# Patient Record
Sex: FEMALE | Race: BLACK OR AFRICAN AMERICAN | Employment: FULL TIME | URBAN - METROPOLITAN AREA
[De-identification: names, ages, dates, MRNs, and addresses within clinical notes are randomized per-mention and may not be internally consistent; named-entity substitution may affect disease eponyms.]

---
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
## 2019-03-17 ENCOUNTER — HOSPITAL ENCOUNTER (EMERGENCY)
Age: 19
Discharge: HOME | End: 2019-03-17
Payer: COMMERCIAL

## 2019-03-17 DIAGNOSIS — J02.9 ACUTE PHARYNGITIS, UNSPECIFIED ETIOLOGY: Primary | ICD-10-CM

## 2019-03-17 DIAGNOSIS — Z71.1 CONCERN ABOUT STD IN MALE WITHOUT DIAGNOSIS: ICD-10-CM

## 2019-03-17 LAB
BACTERIA: ABNORMAL /HPF
BILIRUBIN URINE: NEGATIVE MG/DL
BLOOD, URINE: NEGATIVE
CLARITY: CLEAR
COLOR: YELLOW
GLUCOSE, URINE: NEGATIVE MG/DL
INTERPRETATION: NORMAL
KETONES, URINE: NEGATIVE MG/DL
LEUKOCYTE ESTERASE, URINE: NEGATIVE
MUCUS: ABNORMAL HPF
NITRITE URINE, QUANTITATIVE: NEGATIVE
PH, URINE: 6 (ref 5–8)
PREGNANCY, URINE: NEGATIVE
PROTEIN UA: NEGATIVE MG/DL
RBC URINE: 1 /HPF (ref 0–6)
SPECIFIC GRAVITY UA: 1.02 (ref 1–1.03)
SPECIFIC GRAVITY, URINE: 1.02 (ref 1–1.03)
SQUAMOUS EPITHELIAL: 4 /HPF
TRICHOMONAS: ABNORMAL /HPF
UROBILINOGEN, URINE: NORMAL MG/DL (ref 0.2–1)
WBC UA: 1 /HPF (ref 0–5)

## 2019-03-17 PROCEDURE — 6360000002 HC RX W HCPCS: Performed by: PHYSICIAN ASSISTANT

## 2019-03-17 PROCEDURE — 99283 EMERGENCY DEPT VISIT LOW MDM: CPT

## 2019-03-17 PROCEDURE — 96374 THER/PROPH/DIAG INJ IV PUSH: CPT

## 2019-03-17 PROCEDURE — 87491 CHLMYD TRACH DNA AMP PROBE: CPT

## 2019-03-17 PROCEDURE — 6370000000 HC RX 637 (ALT 250 FOR IP): Performed by: PHYSICIAN ASSISTANT

## 2019-03-17 PROCEDURE — 87081 CULTURE SCREEN ONLY: CPT

## 2019-03-17 PROCEDURE — 81001 URINALYSIS AUTO W/SCOPE: CPT

## 2019-03-17 PROCEDURE — 81025 URINE PREGNANCY TEST: CPT

## 2019-03-17 PROCEDURE — 87591 N.GONORRHOEAE DNA AMP PROB: CPT

## 2019-03-17 PROCEDURE — 87430 STREP A AG IA: CPT

## 2019-03-17 RX ORDER — FLUCONAZOLE 150 MG/1
150 TABLET ORAL ONCE
Qty: 1 TABLET | Refills: 0 | Status: SHIPPED | OUTPATIENT
Start: 2019-03-17 | End: 2019-03-17

## 2019-03-17 RX ORDER — FLUCONAZOLE 100 MG/1
200 TABLET ORAL ONCE
Status: COMPLETED | OUTPATIENT
Start: 2019-03-17 | End: 2019-03-17

## 2019-03-17 RX ORDER — DEXAMETHASONE SODIUM PHOSPHATE 10 MG/ML
10 INJECTION, SOLUTION INTRAMUSCULAR; INTRAVENOUS ONCE
Status: COMPLETED | OUTPATIENT
Start: 2019-03-17 | End: 2019-03-17

## 2019-03-17 RX ADMIN — PENICILLIN G BENZATHINE 1.2 MILLION UNITS: 1200000 INJECTION, SUSPENSION INTRAMUSCULAR at 05:55

## 2019-03-17 RX ADMIN — DEXAMETHASONE SODIUM PHOSPHATE 10 MG: 10 INJECTION, SOLUTION INTRAMUSCULAR; INTRAVENOUS at 05:21

## 2019-03-17 RX ADMIN — FLUCONAZOLE 200 MG: 100 TABLET ORAL at 06:42

## 2019-03-17 ASSESSMENT — PAIN DESCRIPTION - PAIN TYPE: TYPE: ACUTE PAIN

## 2019-03-17 ASSESSMENT — PAIN DESCRIPTION - LOCATION: LOCATION: THROAT

## 2019-03-19 LAB
CULTURE: ABNORMAL
Lab: ABNORMAL
SPECIMEN: ABNORMAL
STREP A DIRECT SCREEN: NEGATIVE

## 2019-03-20 LAB
CHLAMYDIA TRACHOMATIS AMPLIFIED DET: NEGATIVE
CHLAMYDIA TRACHOMATIS AMPLIFIED DET: NORMAL
N GONORRHOEAE AMPLIFIED DET: NEGATIVE
N GONORRHOEAE AMPLIFIED DET: NORMAL

## 2019-06-27 ENCOUNTER — HOSPITAL ENCOUNTER (EMERGENCY)
Age: 19
Discharge: OTHER INSTITUTION | End: 2019-06-29
Attending: EMERGENCY MEDICINE
Payer: COMMERCIAL

## 2019-06-27 DIAGNOSIS — R44.0 AUDITORY HALLUCINATIONS: Primary | ICD-10-CM

## 2019-06-27 DIAGNOSIS — F32.A DEPRESSION, UNSPECIFIED DEPRESSION TYPE: ICD-10-CM

## 2019-06-27 LAB
ACETAMINOPHEN LEVEL: <5 UG/ML (ref 15–30)
ALBUMIN SERPL-MCNC: 3.8 GM/DL (ref 3.4–5)
ALCOHOL SCREEN SERUM: NORMAL %WT/VOL
ALP BLD-CCNC: 93 IU/L (ref 40–129)
ALT SERPL-CCNC: 21 U/L (ref 10–40)
AMPHETAMINES: NEGATIVE
ANION GAP SERPL CALCULATED.3IONS-SCNC: 12 MMOL/L (ref 4–16)
AST SERPL-CCNC: 19 IU/L (ref 15–37)
BARBITURATE SCREEN URINE: NEGATIVE
BASOPHILS ABSOLUTE: 0 K/CU MM
BASOPHILS RELATIVE PERCENT: 0.5 % (ref 0–1)
BENZODIAZEPINE SCREEN, URINE: NEGATIVE
BILIRUB SERPL-MCNC: 0.2 MG/DL (ref 0–1)
BUN BLDV-MCNC: 10 MG/DL (ref 6–23)
CALCIUM SERPL-MCNC: 9.4 MG/DL (ref 8.3–10.6)
CANNABINOID SCREEN URINE: ABNORMAL
CHLORIDE BLD-SCNC: 102 MMOL/L (ref 99–110)
CO2: 23 MMOL/L (ref 21–32)
COCAINE METABOLITE: NEGATIVE
CREAT SERPL-MCNC: 0.7 MG/DL (ref 0.6–1.1)
DIFFERENTIAL TYPE: ABNORMAL
DOSE AMOUNT: ABNORMAL
DOSE AMOUNT: ABNORMAL
DOSE TIME: ABNORMAL
DOSE TIME: ABNORMAL
EOSINOPHILS ABSOLUTE: 0.4 K/CU MM
EOSINOPHILS RELATIVE PERCENT: 5.4 % (ref 0–3)
GFR AFRICAN AMERICAN: >60 ML/MIN/1.73M2
GFR NON-AFRICAN AMERICAN: >60 ML/MIN/1.73M2
GLUCOSE BLD-MCNC: 89 MG/DL (ref 70–99)
HCG QUALITATIVE: NEGATIVE
HCT VFR BLD CALC: 39.7 % (ref 37–47)
HEMOGLOBIN: 11.8 GM/DL (ref 12.5–16)
IMMATURE NEUTROPHIL %: 0.2 % (ref 0–0.43)
LYMPHOCYTES ABSOLUTE: 1.9 K/CU MM
LYMPHOCYTES RELATIVE PERCENT: 28.2 % (ref 24–44)
MCH RBC QN AUTO: 27.3 PG (ref 27–31)
MCHC RBC AUTO-ENTMCNC: 29.7 % (ref 32–36)
MCV RBC AUTO: 91.9 FL (ref 78–100)
MONOCYTES ABSOLUTE: 0.6 K/CU MM
MONOCYTES RELATIVE PERCENT: 9 % (ref 0–4)
NUCLEATED RBC %: 0 %
OPIATES, URINE: NEGATIVE
OXYCODONE: ABNORMAL
PDW BLD-RTO: 13.1 % (ref 11.7–14.9)
PHENCYCLIDINE, URINE: NEGATIVE
PLATELET # BLD: 210 K/CU MM (ref 140–440)
PMV BLD AUTO: 10.1 FL (ref 7.5–11.1)
POTASSIUM SERPL-SCNC: 3.8 MMOL/L (ref 3.5–5.1)
RBC # BLD: 4.32 M/CU MM (ref 4.2–5.4)
SALICYLATE LEVEL: <0.3 MG/DL (ref 15–30)
SEGMENTED NEUTROPHILS ABSOLUTE COUNT: 3.8 K/CU MM
SEGMENTED NEUTROPHILS RELATIVE PERCENT: 56.7 % (ref 36–66)
SODIUM BLD-SCNC: 137 MMOL/L (ref 135–145)
TOTAL IMMATURE NEUTOROPHIL: 0.01 K/CU MM
TOTAL NUCLEATED RBC: 0 K/CU MM
TOTAL PROTEIN: 7.2 GM/DL (ref 6.4–8.2)
WBC # BLD: 6.6 K/CU MM (ref 4–10.5)

## 2019-06-27 PROCEDURE — 85025 COMPLETE CBC W/AUTO DIFF WBC: CPT

## 2019-06-27 PROCEDURE — 80061 LIPID PANEL: CPT

## 2019-06-27 PROCEDURE — 84443 ASSAY THYROID STIM HORMONE: CPT

## 2019-06-27 PROCEDURE — 83721 ASSAY OF BLOOD LIPOPROTEIN: CPT

## 2019-06-27 PROCEDURE — 99285 EMERGENCY DEPT VISIT HI MDM: CPT

## 2019-06-27 PROCEDURE — 80053 COMPREHEN METABOLIC PANEL: CPT

## 2019-06-27 PROCEDURE — G0480 DRUG TEST DEF 1-7 CLASSES: HCPCS

## 2019-06-27 PROCEDURE — 84703 CHORIONIC GONADOTROPIN ASSAY: CPT

## 2019-06-27 PROCEDURE — 80307 DRUG TEST PRSMV CHEM ANLYZR: CPT

## 2019-06-27 PROCEDURE — 36415 COLL VENOUS BLD VENIPUNCTURE: CPT

## 2019-06-28 VITALS
HEART RATE: 55 BPM | RESPIRATION RATE: 16 BRPM | OXYGEN SATURATION: 97 % | TEMPERATURE: 98 F | DIASTOLIC BLOOD PRESSURE: 80 MMHG | WEIGHT: 280 LBS | HEIGHT: 69 IN | SYSTOLIC BLOOD PRESSURE: 140 MMHG | BODY MASS INDEX: 41.47 KG/M2

## 2019-06-28 NOTE — ED NOTES
Resting quietly in exam room with eyes closed.   remains at bedside     Isma Landeros RN  06/28/19 [id number]

## 2019-06-28 NOTE — ED NOTES
Spoke with Magui Curiel at [facility], request patient to take morning medication of Abilify before being transported by [facility] at 8:30 am. Nurse to Nurse to be called in AM to [phone]. Updated Rebeca Gaytan RN on plan of care.       Sue Kenney, EZEQUIEL  06/28/19 2724

## 2019-06-28 NOTE — ED NOTES
Pt reports needing 72 hour stay for what she is calling menstrual depression that has been going on for the last 3 months.       Domenica Royal RN  06/27/19 0234

## 2019-06-28 NOTE — ED NOTES
Report given to E[facility]QUIEL Girard at [facility]   Pt transported via [facility]   No complications   Pt belongings sent with med trans  EMS took emtala form with copy of chart - will call in 1 hour to have copy faxed from mental health       Ene Morataya RN  06/28/19 [address] [facility] [address], RN  06/28/19 8221

## 2019-06-28 NOTE — ED PROVIDER NOTES
eMERGENCY dEPARTMENT eNCOUnter        CHIEF COMPLAINT    Chief Complaint   Patient presents with    Depression     reports having menstural depression       [facility]    Eliza Villalba is a 20 y.o. female who presents with depression. Patient reports a history of depression and follows with Dr. Ventura Winter. She is currently on Abilify. She states her depression has been worse for the last 3 months, particularly around the time of her menstrual period. She states when she is on her menstrual period she starts having suicidal ideation. She denies any plan or attempts. She is not currently on her menstrual period but is due to start next week. She reports trying to get an appointment with Dr. Ventura Winter to discuss possibly adjusting her medications, but she was told she cannot be seen until July. She states she smokes marijuana but denies any other drug or ETOH use. Denies any HI.      REVIEW OF SYSTEMS    See HPI for further details. Review of systems otherwise negative. Constitutional:  Denies fever, chills. HENT:  Denies headache, dizziness, lightheadedness. Respiratory:  Denies any shortness of breath. Cardiovascular:  Denies chest pain, palpitations. GI:  Denies abdominal pain, nausea, vomiting, diarrhea. :  Denies dysuria. Musculoskeletal:  Denies edema, tenderness. Integument:  Denies rashes, lesions. Neurologic:  Denies altered mental status. Denies any numbness or tingling. Psychiatric:  + depression, + suicidal ideation    PAST MEDICAL HISTORY    Past Medical History:   Diagnosis Date    Asthma     Depression     Seasonal allergies        SURGICAL HISTORY    History reviewed. No pertinent surgical history.     CURRENT MEDICATIONS    Current Outpatient Rx   Medication Sig Dispense Refill    Benzocaine-Menthol (CHLORASEPTIC MAX SORE THROAT) 15-10 MG LOZG Take 1 lozenge by mouth every 2 hours as needed (sore throat) 15 lozenge 0       ALLERGIES    No Known Allergies    FAMILY HISTORY    History

## 2019-06-29 LAB
CHOLESTEROL: 124 MG/DL
HDLC SERPL-MCNC: 43 MG/DL
LDL CHOLESTEROL DIRECT: 75 MG/DL
TRIGL SERPL-MCNC: 62 MG/DL
TSH HIGH SENSITIVITY: 1.08 UIU/ML (ref 0.27–4.2)

## 2019-09-22 ASSESSMENT — PAIN DESCRIPTION - PAIN TYPE: TYPE: ACUTE PAIN

## 2019-09-22 ASSESSMENT — PAIN DESCRIPTION - LOCATION: LOCATION: CHEST

## 2019-09-22 ASSESSMENT — PAIN SCALES - GENERAL: PAINLEVEL_OUTOF10: 5

## 2019-09-23 ENCOUNTER — HOSPITAL ENCOUNTER (EMERGENCY)
Age: 19
Discharge: HOME | End: 2019-09-23
Attending: EMERGENCY MEDICINE
Payer: COMMERCIAL

## 2019-09-23 ENCOUNTER — APPOINTMENT (OUTPATIENT)
Dept: GENERAL RADIOLOGY | Age: 19
End: 2019-09-23
Payer: COMMERCIAL

## 2019-09-23 VITALS
HEIGHT: 70 IN | BODY MASS INDEX: 38.65 KG/M2 | HEART RATE: 71 BPM | OXYGEN SATURATION: 96 % | TEMPERATURE: 98.1 F | SYSTOLIC BLOOD PRESSURE: 131 MMHG | RESPIRATION RATE: 15 BRPM | DIASTOLIC BLOOD PRESSURE: 81 MMHG | WEIGHT: 270 LBS

## 2019-09-23 DIAGNOSIS — R05.9 COUGH: Primary | ICD-10-CM

## 2019-09-23 PROCEDURE — 6370000000 HC RX 637 (ALT 250 FOR IP): Performed by: EMERGENCY MEDICINE

## 2019-09-23 PROCEDURE — 99283 EMERGENCY DEPT VISIT LOW MDM: CPT

## 2019-09-23 PROCEDURE — 71045 X-RAY EXAM CHEST 1 VIEW: CPT

## 2019-09-23 RX ORDER — BENZONATATE 100 MG/1
100 CAPSULE ORAL ONCE
Status: COMPLETED | OUTPATIENT
Start: 2019-09-23 | End: 2019-09-23

## 2019-09-23 RX ORDER — NAPROXEN 250 MG/1
500 TABLET ORAL ONCE
Status: COMPLETED | OUTPATIENT
Start: 2019-09-23 | End: 2019-09-23

## 2019-09-23 RX ADMIN — BENZONATATE 100 MG: 100 CAPSULE ORAL at 01:13

## 2019-09-23 RX ADMIN — NAPROXEN 500 MG: 250 TABLET ORAL at 01:13

## 2019-09-23 ASSESSMENT — PAIN SCALES - GENERAL: PAINLEVEL_OUTOF10: 5

## 2019-09-23 NOTE — ED PROVIDER NOTES
thoroughly with the patient and family if present. If performed, all imaging and lab work also discussed with patient. All relevant prior results and chart reviewed if available. Patient presents with signs and symptoms most consistent with viral URI. She is well-appearing with normal vital signs and in no respiratory distress. She has no signs or symptoms consistent with deep neck tissue infection, PTA, meningitis. Chest x-ray shows no evidence of pneumonia. She is given naproxen and Tessalon Perles for symptoms. She agrees to drink plenty of fluids at home, follow-up with PCP as needed. Clinical Impression:  1.  Cough      (Please note that portions of this note may have been completed with a voice recognition program. Efforts were made to edit the dictations but occasionally words are mis-transcribed.)    MD Talon Corral MD  09/23/19 9237

## 2020-01-06 ENCOUNTER — HOSPITAL ENCOUNTER (EMERGENCY)
Age: 20
Discharge: HOME | End: 2020-01-06
Attending: EMERGENCY MEDICINE
Payer: COMMERCIAL

## 2020-01-06 ENCOUNTER — APPOINTMENT (OUTPATIENT)
Dept: CT IMAGING | Age: 20
End: 2020-01-06

## 2020-01-06 VITALS
TEMPERATURE: 98.5 F | HEIGHT: 69 IN | OXYGEN SATURATION: 100 % | SYSTOLIC BLOOD PRESSURE: 135 MMHG | WEIGHT: 280 LBS | BODY MASS INDEX: 41.47 KG/M2 | HEART RATE: 108 BPM | DIASTOLIC BLOOD PRESSURE: 76 MMHG | RESPIRATION RATE: 29 BRPM

## 2020-01-06 LAB
ALBUMIN SERPL-MCNC: 3.7 GM/DL (ref 3.4–5)
ALP BLD-CCNC: 91 IU/L (ref 40–129)
ALT SERPL-CCNC: 20 U/L (ref 10–40)
ANION GAP SERPL CALCULATED.3IONS-SCNC: 10 MMOL/L (ref 4–16)
AST SERPL-CCNC: 18 IU/L (ref 15–37)
BASOPHILS ABSOLUTE: 0 K/CU MM
BASOPHILS RELATIVE PERCENT: 0.2 % (ref 0–1)
BILIRUB SERPL-MCNC: 0.2 MG/DL (ref 0–1)
BUN BLDV-MCNC: 9 MG/DL (ref 6–23)
CALCIUM SERPL-MCNC: 9.5 MG/DL (ref 8.3–10.6)
CHLORIDE BLD-SCNC: 100 MMOL/L (ref 99–110)
CHP ED QC CHECK: NORMAL
CO2: 22 MMOL/L (ref 21–32)
CREAT SERPL-MCNC: 0.6 MG/DL (ref 0.6–1.1)
DIFFERENTIAL TYPE: ABNORMAL
EOSINOPHILS ABSOLUTE: 0.2 K/CU MM
EOSINOPHILS RELATIVE PERCENT: 2.1 % (ref 0–3)
GFR AFRICAN AMERICAN: >60 ML/MIN/1.73M2
GFR NON-AFRICAN AMERICAN: >60 ML/MIN/1.73M2
GLUCOSE BLD-MCNC: 112 MG/DL (ref 70–99)
GLUCOSE BLD-MCNC: 96 MG/DL
GLUCOSE BLD-MCNC: 96 MG/DL (ref 70–99)
HCG QUALITATIVE: NEGATIVE
HCT VFR BLD CALC: 41 % (ref 37–47)
HEMOGLOBIN: 12.5 GM/DL (ref 12.5–16)
IMMATURE NEUTROPHIL %: 0.4 % (ref 0–0.43)
LYMPHOCYTES ABSOLUTE: 1.4 K/CU MM
LYMPHOCYTES RELATIVE PERCENT: 16.8 % (ref 24–44)
MCH RBC QN AUTO: 27.5 PG (ref 27–31)
MCHC RBC AUTO-ENTMCNC: 30.5 % (ref 32–36)
MCV RBC AUTO: 90.3 FL (ref 78–100)
MONOCYTES ABSOLUTE: 0.6 K/CU MM
MONOCYTES RELATIVE PERCENT: 7 % (ref 0–4)
NUCLEATED RBC %: 0 %
PDW BLD-RTO: 13 % (ref 11.7–14.9)
PLATELET # BLD: 196 K/CU MM (ref 140–440)
PMV BLD AUTO: 10.1 FL (ref 7.5–11.1)
POTASSIUM SERPL-SCNC: 3.9 MMOL/L (ref 3.5–5.1)
RBC # BLD: 4.54 M/CU MM (ref 4.2–5.4)
SEGMENTED NEUTROPHILS ABSOLUTE COUNT: 6.1 K/CU MM
SEGMENTED NEUTROPHILS RELATIVE PERCENT: 73.5 % (ref 36–66)
SODIUM BLD-SCNC: 132 MMOL/L (ref 135–145)
TOTAL IMMATURE NEUTOROPHIL: 0.03 K/CU MM
TOTAL NUCLEATED RBC: 0 K/CU MM
TOTAL PROTEIN: 7.4 GM/DL (ref 6.4–8.2)
WBC # BLD: 8.2 K/CU MM (ref 4–10.5)

## 2020-01-06 PROCEDURE — 84703 CHORIONIC GONADOTROPIN ASSAY: CPT

## 2020-01-06 PROCEDURE — 93010 ELECTROCARDIOGRAM REPORT: CPT | Performed by: INTERNAL MEDICINE

## 2020-01-06 PROCEDURE — 80053 COMPREHEN METABOLIC PANEL: CPT

## 2020-01-06 PROCEDURE — 93005 ELECTROCARDIOGRAM TRACING: CPT | Performed by: EMERGENCY MEDICINE

## 2020-01-06 PROCEDURE — 82962 GLUCOSE BLOOD TEST: CPT

## 2020-01-06 PROCEDURE — 70450 CT HEAD/BRAIN W/O DYE: CPT

## 2020-01-06 PROCEDURE — 99284 EMERGENCY DEPT VISIT MOD MDM: CPT

## 2020-01-06 PROCEDURE — 85025 COMPLETE CBC W/AUTO DIFF WBC: CPT

## 2020-01-06 ASSESSMENT — ENCOUNTER SYMPTOMS
VOMITING: 0
ABDOMINAL PAIN: 0
NAUSEA: 1
SORE THROAT: 0
EYE REDNESS: 0
SHORTNESS OF BREATH: 0
DIARRHEA: 0
RHINORRHEA: 0
COUGH: 0
CONSTIPATION: 0
BACK PAIN: 0

## 2020-01-06 ASSESSMENT — PAIN DESCRIPTION - PAIN TYPE: TYPE: ACUTE PAIN

## 2020-01-06 ASSESSMENT — PAIN DESCRIPTION - LOCATION: LOCATION: HEAD

## 2020-01-06 ASSESSMENT — PAIN SCALES - GENERAL: PAINLEVEL_OUTOF10: 5

## 2020-01-06 NOTE — ED PROVIDER NOTES
Highest education level: Not on file   Occupational History    Not on file   Social Needs    Financial resource strain: Not on file    Food insecurity:     Worry: Not on file     Inability: Not on file    Transportation needs:     Medical: Not on file     Non-medical: Not on file   Tobacco Use    Smoking status: Never Smoker    Smokeless tobacco: Never Used   Substance and Sexual Activity    Alcohol use: Yes     Comment: rare    Drug use: Yes     Types: Marijuana    Sexual activity: Never   Lifestyle    Physical activity:     Days per week: Not on file     Minutes per session: Not on file    Stress: Not on file   Relationships    Social connections:     Talks on phone: Not on file     Gets together: Not on file     Attends Confucianism service: Not on file     Active member of club or organization: Not on file     Attends meetings of clubs or organizations: Not on file     Relationship status: Not on file    Intimate partner violence:     Fear of current or ex partner: Not on file     Emotionally abused: Not on file     Physically abused: Not on file     Forced sexual activity: Not on file   Other Topics Concern    Not on file   Social History Narrative    Not on file     No current facility-administered medications for this encounter. Current Outpatient Medications   Medication Sig Dispense Refill    Benzocaine-Menthol (CHLORASEPTIC MAX SORE THROAT) 15-10 MG LOZG Take 1 lozenge by mouth every 2 hours as needed (sore throat) 15 lozenge 0     No Known Allergies    Nursing Notes Reviewed     Physical Exam:   ED Triage Vitals [01/06/20 1233]   Enc Vitals Group      /76      Pulse 98      Resp 24      Temp 98.5 °F (36.9 °C)      Temp Source Oral      SpO2 100 %      Weight 280 lb (127 kg)      Height 5' 9\" (1.753 m)      Head Circumference       Peak Flow       Pain Score       Pain Loc       Pain Edu? Excl. in [address]?       /76   Pulse 108   Temp 98.5 °F (36.9 °C) (Oral)   Resp 29 % 16.8 (L) 24 - 44 %    Monocytes % 7.0 (H) 0 - 4 %    Eosinophils % 2.1 0 - 3 %    Basophils % 0.2 0 - 1 %    Segs Absolute 6.1 K/CU MM    Lymphocytes Absolute 1.4 K/CU MM    Monocytes Absolute 0.6 K/CU MM    Eosinophils Absolute 0.2 K/CU MM    Basophils Absolute 0.0 K/CU MM    Nucleated RBC % 0.0 %    Total Nucleated RBC 0.0 K/CU MM    Total Immature Neutrophil 0.03 K/CU MM    Immature Neutrophil % 0.4 0 - 0.43 %   Comprehensive Metabolic Panel w/ Reflex to MG   Result Value Ref Range    Sodium 132 (L) 135 - 145 MMOL/L    Potassium 3.9 3.5 - 5.1 MMOL/L    Chloride 100 99 - 110 mMol/L    CO2 22 21 - 32 MMOL/L    BUN 9 6 - 23 MG/DL    CREATININE 0.6 0.6 - 1.1 MG/DL    Glucose 112 (H) 70 - 99 MG/DL    Calcium 9.5 8.3 - 10.6 MG/DL    Alb 3.7 3.4 - 5.0 GM/DL    Total Protein 7.4 6.4 - 8.2 GM/DL    Total Bilirubin 0.2 0.0 - 1.0 MG/DL    ALT 20 10 - 40 U/L    AST 18 15 - 37 IU/L    Alkaline Phosphatase 91 40 - 129 IU/L    GFR Non-African American >60 >60 mL/min/1.73m2    GFR African American >60 >60 mL/min/1.73m2    Anion Gap 10 4 - 16   HCG Serum, Qualitative   Result Value Ref Range    hCG Qual NEGATIVE    POC Blood Glucose   Result Value Ref Range    Glucose 96 mg/dL    QC OK? y    POCT Glucose   Result Value Ref Range    POC Glucose 96 70 - 99 MG/DL   EKG 12 Lead   Result Value Ref Range    Ventricular Rate 98 BPM    Atrial Rate 98 BPM    P-R Interval 204 ms    QRS Duration 82 ms    Q-T Interval 326 ms    QTc Calculation (Bazett) 416 ms    P Axis 51 degrees    R Axis 48 degrees    T Axis 20 degrees    Diagnosis       Normal sinus rhythm with sinus arrhythmia  Normal ECG  No previous ECGs available        Radiographs (if obtained):  [] The following radiograph was interpreted by myself in the absence of a radiologist:  [x]Radiologist's Report Reviewed:  CT Head WO Contrast   Final Result   No acute intracranial abnormality.                EKG (if obtained): (All EKG's are interpreted by myself in the absence of a cardiologist)  Normal sinus rhythm with sinus arrhythmia. Rate of 98. NH interval 204, QRS 82, QTc 416. No ST elevations or depressions. Normal T waves. Impression: Normal EKG. No previous EKGs for comparison. MDM:  Differential diagnoses considered include orthostatic syncope, hypoglycemia, electrolyte abnormality, cardiac arrhythmia, ectopic pregnancy, vasovagal syncope. EKG is not concerning for acute ischemia or arrhythmia. Upon arrival patient is not tachycardic and has normal vital signs. Basic labs are obtained and are unremarkable. Pregnancy test is negative. I do not suspect ectopic pregnancy. Glucose levels are normal.  CT scan of patient's head shows no acute intracranial abnormalities. Patient's orthostatic vital signs are negative. Patient states she has not been eating or drinking much lately and I suspect some of her symptoms are due to mild dehydration. She did have a doughnut and a Frappuccino for breakfast this morning I suspect that also contributed to her symptoms. I encouraged her to eat more protein filled breakfast.  I do not suspect an emergent cause of her symptoms or any emergent injuries from her fall. We will discharge her home in stable condition. Recommended close follow-up with her primary care physician. Cardiac monitor revealed sinus rhythm with a heart rate of 90 as interpreted by me. Patient presented with syncope and cardiac monitor was ordered to monitor for dysrhythmia. Plan of care explained to patient. Concerning signs and symptoms warranting a return visit to the Emergency Department were explained in detail. All questions and concerns were addressed to the patient's satisfaction. Patient understood and agreed with plan. The likelihood of other entities in the differential is insufficient to justify any further testing for them. This was explained to the patient.  The patient was advised that persistent or worsening symptoms would

## 2020-01-14 LAB
EKG ATRIAL RATE: 98 BPM
EKG DIAGNOSIS: NORMAL
EKG P AXIS: 51 DEGREES
EKG P-R INTERVAL: 204 MS
EKG Q-T INTERVAL: 326 MS
EKG QRS DURATION: 82 MS
EKG QTC CALCULATION (BAZETT): 416 MS
EKG R AXIS: 48 DEGREES
EKG T AXIS: 20 DEGREES
EKG VENTRICULAR RATE: 98 BPM

## 2020-03-12 ENCOUNTER — HOSPITAL ENCOUNTER (EMERGENCY)
Age: 20
Discharge: HOME HEALTH | End: 2020-03-12
Payer: COMMERCIAL

## 2020-03-19 ENCOUNTER — HOSPITAL ENCOUNTER (EMERGENCY)
Age: 20
Discharge: HOME | End: 2020-03-20
Attending: EMERGENCY MEDICINE
Payer: MEDICAID

## 2020-03-19 PROCEDURE — 99284 EMERGENCY DEPT VISIT MOD MDM: CPT

## 2020-03-20 ENCOUNTER — APPOINTMENT (OUTPATIENT)
Dept: GENERAL RADIOLOGY | Age: 20
End: 2020-03-20
Payer: MEDICAID

## 2020-03-20 VITALS
BODY MASS INDEX: 43.95 KG/M2 | RESPIRATION RATE: 18 BRPM | SYSTOLIC BLOOD PRESSURE: 115 MMHG | HEART RATE: 95 BPM | WEIGHT: 280 LBS | TEMPERATURE: 98.1 F | OXYGEN SATURATION: 98 % | HEIGHT: 67 IN | DIASTOLIC BLOOD PRESSURE: 85 MMHG

## 2020-03-20 PROCEDURE — 93010 ELECTROCARDIOGRAM REPORT: CPT | Performed by: INTERNAL MEDICINE

## 2020-03-20 PROCEDURE — 71046 X-RAY EXAM CHEST 2 VIEWS: CPT

## 2020-03-20 PROCEDURE — 93005 ELECTROCARDIOGRAM TRACING: CPT | Performed by: EMERGENCY MEDICINE

## 2020-03-20 RX ORDER — GUAIFENESIN 600 MG/1
600 TABLET, EXTENDED RELEASE ORAL 2 TIMES DAILY
Qty: 20 TABLET | Refills: 0 | Status: SHIPPED | OUTPATIENT
Start: 2020-03-20 | End: 2020-03-30

## 2020-03-20 RX ORDER — BENZONATATE 100 MG/1
100 CAPSULE ORAL 3 TIMES DAILY PRN
Qty: 20 CAPSULE | Refills: 0 | Status: SHIPPED | OUTPATIENT
Start: 2020-03-20 | End: 2020-03-27

## 2020-03-20 RX ORDER — CIPROFLOXACIN AND DEXAMETHASONE 3; 1 MG/ML; MG/ML
4 SUSPENSION/ DROPS AURICULAR (OTIC) 2 TIMES DAILY
Qty: 1 BOTTLE | Refills: 0 | Status: SHIPPED | OUTPATIENT
Start: 2020-03-20 | End: 2020-03-27

## 2020-03-20 ASSESSMENT — ENCOUNTER SYMPTOMS
SHORTNESS OF BREATH: 0
TROUBLE SWALLOWING: 0
EYE ITCHING: 0
EYES NEGATIVE: 1
EYE REDNESS: 0
VOMITING: 0
RHINORRHEA: 0
GASTROINTESTINAL NEGATIVE: 1
CHOKING: 0
CHEST TIGHTNESS: 0
CONSTIPATION: 0
SINUS PRESSURE: 0
SORE THROAT: 0
VOICE CHANGE: 0
BACK PAIN: 0
NAUSEA: 0
BLOOD IN STOOL: 0
WHEEZING: 0
RECTAL PAIN: 0
DIARRHEA: 0
APNEA: 0
COUGH: 1
ABDOMINAL PAIN: 0
EYE DISCHARGE: 0
FACIAL SWELLING: 0
SINUS PAIN: 0
EYE PAIN: 0
STRIDOR: 0
PHOTOPHOBIA: 0

## 2020-03-20 NOTE — ED PROVIDER NOTES
[facility] [place]      [place] Name: Chin Stewart  MRN: [id number]  [place] 2000  Date of evaluation: 3/19/2020  Provider: Arnav Mei DO    CHIEF COMPLAINT       Chief Complaint   Patient presents with    Cough     started today     Rectal Bleeding     states that she is lactose intolerant and has been \"eating a lot of cereal with milk\"          HISTORY OF PRESENT ILLNESS      Chin Stewart is a 23 y.o. female who presents to the emergency department  for   Chief Complaint   Patient presents with    Cough     started today     Rectal Bleeding     states that she is lactose intolerant and has been \"eating a lot of cereal with milk\"        The history is provided by the patient. No  was used. Ear Problem   Location:  Right  Behind ear:  No abnormality  Quality:  Aching  Severity:  Mild  Onset quality:  Gradual  Duration:  1 day  Timing:  Constant  Progression:  Worsening  Chronicity:  New  Context: not direct blow, not elevation change, not foreign body in ear, not loud noise, not recent URI and not water in ear    Relieved by:  Nothing  Worsened by:  Nothing  Ineffective treatments:  None tried  Associated symptoms: cough and ear discharge    Associated symptoms: no abdominal pain, no congestion, no diarrhea, no fever, no headaches, no hearing loss, no neck pain, no rash, no rhinorrhea, no sore throat, no tinnitus and no vomiting    Risk factors: no recent travel and no prior ear surgery          Nursing Notes, Triage Notes & Vital Signs were reviewed. REVIEW OF SYSTEMS    (2-9 systems for level 4, 10 or more for level 5)     Review of Systems   Constitutional: Negative. Negative for activity change, appetite change, chills, fatigue and fever. HENT: Positive for ear discharge.  Negative for congestion, dental problem, drooling, facial swelling, hearing loss, nosebleeds, postnasal drip, rhinorrhea, sinus pressure, sinus pain, sore throat, tinnitus, trouble swallowing and voice change. Eyes: Negative. Negative for photophobia, pain, discharge, redness, itching and visual disturbance. Respiratory: Positive for cough. Negative for apnea, choking, chest tightness, shortness of breath, wheezing and stridor. Cardiovascular: Negative. Negative for chest pain, palpitations and leg swelling. Gastrointestinal: Negative. Negative for abdominal pain, blood in stool, constipation, diarrhea, nausea, rectal pain and vomiting. Endocrine: Negative for polyuria. Genitourinary: Negative. Negative for dysuria, flank pain, frequency, hematuria and urgency. Musculoskeletal: Negative. Negative for arthralgias, back pain, gait problem, myalgias, neck pain and neck stiffness. Skin: Negative. Negative for rash. Neurological: Negative. Negative for dizziness, speech difficulty, light-headedness, numbness and headaches. Psychiatric/Behavioral: Negative. Negative for agitation, confusion, self-injury, sleep disturbance and suicidal ideas. The patient is not nervous/anxious. All other systems reviewed and are negative. Except as noted above the remainder of the review of systems was reviewed and negative. PAST MEDICAL HISTORY     Past Medical History:   Diagnosis Date    Asthma     Depression     Seasonal allergies        Prior to Admission medications    Medication Sig Start Date End Date Taking? Authorizing Provider   benzonatate (TESSALON) 100 MG capsule Take 1 capsule by mouth 3 times daily as needed for Cough 3/20/20 3/27/20 Yes Tracey Ricketts DO   guaiFENesin [facility]) 600 MG extended release tablet Take 1 tablet by mouth 2 times daily for 10 days 3/20/20 3/30/20 Yes Tracey Ricketts DO   ciprofloxacin-dexamethasone (CIPRODEX) 0.3-0.1 % otic suspension Place 4 drops in ear(s) 2 times daily for 7 days Apply in affected ear(s).  Dispense QS 7 day course. 3/20/20 3/27/20 Yes Estefany Ambrosio,    Benzocaine-Menthol (CHLORASEPTIC MAX SORE THROAT) 15-10 MG LOZG Take 1 lozenge by mouth every 2 hours as needed (sore throat) 3/17/19   Yolanda Gabriel PA-C        There is no problem list on file for this patient. SURGICAL HISTORY     History reviewed. No pertinent surgical history. CURRENT MEDICATIONS       Previous Medications    BENZOCAINE-MENTHOL (CHLORASEPTIC MAX SORE THROAT) 15-10 MG LOZG    Take 1 lozenge by mouth every 2 hours as needed (sore throat)       ALLERGIES     Patient has no known allergies. FAMILY HISTORY     History reviewed. No pertinent family history.        SOCIAL HISTORY       Social History     Socioeconomic History    Marital status: Single     Spouse name: None    Number of children: None    Years of education: None    Highest education level: None   Occupational History    None   Social Needs    Financial resource strain: None    Food insecurity     Worry: None     Inability: None    Transportation needs     Medical: None     Non-medical: None   Tobacco Use    Smoking status: Never Smoker    Smokeless tobacco: Never Used   Substance and Sexual Activity    Alcohol use: Yes     Comment: rare    Drug use: Yes     Types: Marijuana    Sexual activity: Never   Lifestyle    Physical activity     Days per week: None     Minutes per session: None    Stress: None   Relationships    Social connections     Talks on phone: None     Gets together: None     Attends Mandaen service: None     Active member of club or organization: None     Attends meetings of clubs or organizations: None     Relationship status: None    Intimate partner violence     Fear of current or ex partner: None     Emotionally abused: None     Physically abused: None     Forced sexual activity: None   Other Topics Concern    None   Social History Narrative    None       SCREENINGS    [place] Coma Scale  Eye Opening: Spontaneous  Best Verbal Response: Oriented  Best Motor Response: Obeys commands  Vlad Coma Scale Score: 15          PHYSICAL EXAM    (up to 7 for level 4, 8 or more for level 5)     ED Triage Vitals   BP Temp Temp Source Pulse Resp SpO2 Height Weight   03/19/20 2354 03/19/20 2336 03/19/20 2336 03/19/20 2336 03/19/20 2336 03/19/20 2336 03/19/20 2332 03/19/20 2332   (!) 110/90 98.1 °F (36.7 °C) Oral 115 16 98 % 5' 7\" (1.702 m) 280 lb (127 kg)       Physical Exam  Vitals signs and nursing note reviewed. Constitutional:       General: She is not in acute distress. Appearance: She is well-developed. She is not diaphoretic. HENT:      Head: Normocephalic and atraumatic. Right Ear: Tympanic membrane and external ear normal. Drainage and swelling present. No laceration. Left Ear: Hearing, tympanic membrane, ear canal and external ear normal.      Nose: Nose normal.      Mouth/Throat:      Pharynx: No oropharyngeal exudate. Eyes:      General: No scleral icterus. Right eye: No discharge. Left eye: No discharge. Pupils: Pupils are equal, round, and reactive to light. Neck:      Musculoskeletal: Normal range of motion. Thyroid: No thyromegaly. Vascular: No JVD. Trachea: No tracheal deviation. Cardiovascular:      Rate and Rhythm: Normal rate and regular rhythm. Heart sounds: Normal heart sounds. No murmur. No friction rub. No gallop. Pulmonary:      Effort: Pulmonary effort is normal. No respiratory distress. Breath sounds: Normal breath sounds. No stridor. No wheezing or rales. Chest:      Chest wall: No tenderness. Abdominal:      General: Bowel sounds are normal. There is no distension. Palpations: Abdomen is soft. There is no mass. Tenderness: There is no abdominal tenderness. There is no guarding or rebound. Musculoskeletal: Normal range of motion. General: No tenderness or deformity. Lymphadenopathy:      Cervical: No cervical adenopathy.    Skin:     General: Skin is warm.      Capillary Refill: Capillary refill takes less than 2 seconds. Coloration: Skin is not pale. Findings: No erythema or rash. Neurological:      Mental Status: She is alert and oriented to person, place, and time. Cranial Nerves: No cranial nerve deficit. Sensory: No sensory deficit. Motor: No abnormal muscle tone. Coordination: Coordination normal.      Deep Tendon Reflexes: Reflexes normal.   Psychiatric:         Behavior: Behavior normal.         Thought Content: Thought content normal.         Judgment: Judgment normal.         DIAGNOSTIC RESULTS     Labs Reviewed   URINALYSIS   PREGNANCY, URINE          EKG: All EKG's are interpreted by the Emergency Department Physician who either signs or Co-signs this chart in the absence of a cardiologist.     EKG Interpretation    Interpreted by emergency department physician    Rhythm: normal sinus   Rate: normal  Axis: normal  Ectopy: none  Conduction: 1st degree AV block  ST Segments: no acute change  T Waves: no acute change  Q Waves: none    Clinical Impression: no acute changes    Surendra Ordoñez    EKG Interpretation    Interpreted by emergency department physician    [address]:     Non-plain film images such as CT, Ultrasound and MRI are read by the radiologist. Plain radiographic images are visualized and preliminarily interpreted by the emergency physician. Interpretation per the Radiologist below, if available at the time of this note:    XR CHEST STANDARD (2 VW)    (Results Pending)         ED BEDSIDE ULTRASOUND:   Performed by ED Physician Surendra Ordoñez DO       LABS:  Labs Reviewed   URINALYSIS   PREGNANCY, URINE       All other labs were within normal range or not returned as of this dictation.     EMERGENCY DEPARTMENT COURSE and DIFFERENTIAL DIAGNOSIS/MDM:   Vitals:    Vitals:    03/19/20 2332 03/19/20 2336 03/19/20 2354   BP:   (!) 110/90   Pulse:  115    Resp:  16    Temp:  98.1 °F (36.7 °C)    TempSrc:  Oral    SpO2:  98%    Weight: 280 lb (127 kg)     Height: 5' 7\" (1.702 m)             MDM  Number of Diagnoses or Management Options  Diagnosis management comments: 54-year-old female presents emergency department with chief complaint of cough and reported discharge and minimal blood from the right ear. On examination, patient has some erythema to the external auditory canal.  Patient has minimal discharge. Patient denies rectal bleeding though that was mentioned from triage. Chest x-ray was negative. Patient is afebrile. Patient does not have chest pain or shortness of breath. Patient has no other risk factors for pulmonary embolism. Patient is afebrile and has no contacts with anyone under investigation for the novel coronavirus. Will discharge patient to home with Ciprodex, Tessalon, return precautions and primary care follow-up. Amount and/or Complexity of Data Reviewed  Clinical lab tests: ordered and reviewed  Tests in the radiology section of CPT®: ordered and reviewed  Tests in the medicine section of CPT®: ordered and reviewed    Risk of Complications, Morbidity, and/or Mortality  Presenting problems: moderate  Diagnostic procedures: moderate  Management options: moderate    Critical Care  Total time providing critical care: < 30 minutes    Patient Progress  Patient progress: improved        REASSESSMENT          CRITICAL CARE TIME     Total critical care time provided today was 0 minutes. This excludes seperately billable procedures and family discussion time. Critical care time provided for obtaining history, conducting a physical exam, performing and monitoring interventions, ordering, collecting and interpreting tests, and establishing medical decision-making. There was a potential for life/limb threatening pathology requiring close evaluation and intervention with concern for patient decompensation.     CONSULTS:  None    PROCEDURES:  None performed unless otherwise noted

## 2020-03-27 ENCOUNTER — HOSPITAL ENCOUNTER (EMERGENCY)
Age: 20
Discharge: HOME | End: 2020-03-28
Attending: EMERGENCY MEDICINE
Payer: MEDICAID

## 2020-03-27 ENCOUNTER — APPOINTMENT (OUTPATIENT)
Dept: GENERAL RADIOLOGY | Age: 20
End: 2020-03-27
Payer: MEDICAID

## 2020-03-27 LAB
RAPID INFLUENZA  B AGN: NEGATIVE
RAPID INFLUENZA A AGN: NEGATIVE

## 2020-03-27 PROCEDURE — 87430 STREP A AG IA: CPT

## 2020-03-27 PROCEDURE — 71045 X-RAY EXAM CHEST 1 VIEW: CPT

## 2020-03-27 PROCEDURE — 99283 EMERGENCY DEPT VISIT LOW MDM: CPT

## 2020-03-27 PROCEDURE — 6370000000 HC RX 637 (ALT 250 FOR IP): Performed by: EMERGENCY MEDICINE

## 2020-03-27 PROCEDURE — 87081 CULTURE SCREEN ONLY: CPT

## 2020-03-27 PROCEDURE — 87804 INFLUENZA ASSAY W/OPTIC: CPT

## 2020-03-27 PROCEDURE — 94640 AIRWAY INHALATION TREATMENT: CPT

## 2020-03-27 RX ORDER — GUAIFENESIN 100 MG/5ML
200 SOLUTION ORAL ONCE
Status: COMPLETED | OUTPATIENT
Start: 2020-03-27 | End: 2020-03-27

## 2020-03-27 RX ORDER — BENZONATATE 100 MG/1
100 CAPSULE ORAL ONCE
Status: COMPLETED | OUTPATIENT
Start: 2020-03-27 | End: 2020-03-27

## 2020-03-27 RX ORDER — ALBUTEROL SULFATE 90 UG/1
2 AEROSOL, METERED RESPIRATORY (INHALATION) EVERY 4 HOURS PRN
Qty: 1 INHALER | Refills: 1 | Status: SHIPPED | OUTPATIENT
Start: 2020-03-27 | End: 2021-05-02

## 2020-03-27 RX ORDER — NAPROXEN 500 MG/1
500 TABLET ORAL 2 TIMES DAILY
Qty: 60 TABLET | Refills: 0 | Status: SHIPPED | OUTPATIENT
Start: 2020-03-27 | End: 2021-05-02

## 2020-03-27 RX ORDER — ACETAMINOPHEN 325 MG/1
650 TABLET ORAL EVERY 6 HOURS PRN
Qty: 120 TABLET | Refills: 3 | Status: SHIPPED | OUTPATIENT
Start: 2020-03-27 | End: 2021-05-02

## 2020-03-27 RX ORDER — NAPROXEN 250 MG/1
500 TABLET ORAL ONCE
Status: COMPLETED | OUTPATIENT
Start: 2020-03-27 | End: 2020-03-27

## 2020-03-27 RX ORDER — GUAIFENESIN/DEXTROMETHORPHAN 100-10MG/5
5 SYRUP ORAL 4 TIMES DAILY PRN
Qty: 120 ML | Refills: 0 | Status: SHIPPED | OUTPATIENT
Start: 2020-03-27 | End: 2020-04-06

## 2020-03-27 RX ORDER — ALBUTEROL SULFATE 90 UG/1
2 AEROSOL, METERED RESPIRATORY (INHALATION) ONCE
Status: COMPLETED | OUTPATIENT
Start: 2020-03-27 | End: 2020-03-27

## 2020-03-27 RX ORDER — AZITHROMYCIN 250 MG/1
TABLET, FILM COATED ORAL
Qty: 1 PACKET | Refills: 0 | Status: SHIPPED | OUTPATIENT
Start: 2020-03-27 | End: 2020-04-06

## 2020-03-27 RX ORDER — BENZONATATE 100 MG/1
100 CAPSULE ORAL 3 TIMES DAILY PRN
Qty: 10 CAPSULE | Refills: 0 | Status: SHIPPED | OUTPATIENT
Start: 2020-03-27 | End: 2020-04-03

## 2020-03-27 RX ORDER — ACETAMINOPHEN 500 MG
1000 TABLET ORAL ONCE
Status: COMPLETED | OUTPATIENT
Start: 2020-03-27 | End: 2020-03-27

## 2020-03-27 RX ADMIN — GUAIFENESIN 200 MG: 200 SOLUTION ORAL at 22:22

## 2020-03-27 RX ADMIN — ACETAMINOPHEN 1000 MG: 500 TABLET, FILM COATED ORAL at 22:22

## 2020-03-27 RX ADMIN — BENZONATATE 100 MG: 100 CAPSULE ORAL at 22:22

## 2020-03-27 RX ADMIN — NAPROXEN 500 MG: 250 TABLET ORAL at 22:22

## 2020-03-27 RX ADMIN — ALBUTEROL SULFATE 2 PUFF: 90 AEROSOL, METERED RESPIRATORY (INHALATION) at 23:25

## 2020-03-27 ASSESSMENT — ENCOUNTER SYMPTOMS
COUGH: 1
RHINORRHEA: 1
EYES NEGATIVE: 1
ALLERGIC/IMMUNOLOGIC NEGATIVE: 1
SORE THROAT: 1
GASTROINTESTINAL NEGATIVE: 1

## 2020-03-27 ASSESSMENT — PAIN SCALES - GENERAL: PAINLEVEL_OUTOF10: 8

## 2020-03-28 VITALS
OXYGEN SATURATION: 99 % | SYSTOLIC BLOOD PRESSURE: 124 MMHG | HEIGHT: 68 IN | TEMPERATURE: 98.6 F | BODY MASS INDEX: 42.44 KG/M2 | RESPIRATION RATE: 20 BRPM | WEIGHT: 280 LBS | DIASTOLIC BLOOD PRESSURE: 64 MMHG | HEART RATE: 81 BPM

## 2020-03-28 NOTE — ED PROVIDER NOTES
[facility]      TRIAGE CHIEF COMPLAINT:   Cough and Diarrhea      [place]:  Diogenes Haney is a 23 y.o. female that presents with complaint of cough, sore throat, ear pain. Patient states she is here the other day for the same complaints they give her medication told was bronchitis she states she cannot afford her medication came here for evaluation again. She states she is been coughing nonproductive denies any fevers nausea vomiting chest pain shortness of breath travel sick contacts no abdominal pain states diarrhea she has good days better. No urine complaints denies being pregnant. Complains of some sore throat, drainage. Denies any headache she has had some bilateral ear pain. No other questions or concerns. She does have asthma. She is resting comfortably in bed listening to her iPhone watching television    REVIEW OF SYSTEMS:  At least 10 systems reviewed and otherwise acutely negative except as in the [address] Review of Systems   Constitutional: Negative. HENT: Positive for congestion, ear pain, rhinorrhea and sore throat. Eyes: Negative. Respiratory: Positive for cough. Cardiovascular: Negative. Gastrointestinal: Negative. Endocrine: Negative. Genitourinary: Negative. Musculoskeletal: Negative. Skin: Negative. Allergic/Immunologic: Negative. Neurological: Negative. Hematological: Negative. Psychiatric/Behavioral: Negative. All other systems reviewed and are negative. Past Medical History:   Diagnosis Date    Asthma     Depression     Seasonal allergies      History reviewed. No pertinent surgical history. History reviewed. No pertinent family history.   Social History     Socioeconomic History    Marital status: Single     Spouse name: Not on file    Number of children: Not on file    Years of education: Not on file    Highest education level: Not on file   Occupational History    Not on file   Social Needs    Financial resource strain: Not on file    Food insecurity     Worry: Not on file     Inability: Not on file    Transportation needs     Medical: Not on file     Non-medical: Not on file   Tobacco Use    Smoking status: Current Every Day Smoker     Types: Cigarettes, E-Cigarettes    Smokeless tobacco: Never Used   Substance and Sexual Activity    Alcohol use: Yes     Comment: rare    Drug use: Yes     Types: Marijuana    Sexual activity: Never   Lifestyle    Physical activity     Days per week: Not on file     Minutes per session: Not on file    Stress: Not on file   Relationships    Social connections     Talks on phone: Not on file     Gets together: Not on file     Attends Christian service: Not on file     Active member of club or organization: Not on file     Attends meetings of clubs or organizations: Not on file     Relationship status: Not on file    Intimate partner violence     Fear of current or ex partner: Not on file     Emotionally abused: Not on file     Physically abused: Not on file     Forced sexual activity: Not on file   Other Topics Concern    Not on file   Social History Narrative    Not on file     Current Facility-Administered Medications   Medication Dose Route Frequency Provider Last Rate Last Dose    albuterol sulfate  (90 Base) MCG/ACT inhaler 2 puff  2 puff Inhalation Once Orien Crease, DO         Current Outpatient Medications   Medication Sig Dispense Refill    benzonatate (TESSALON PERLES) 100 MG capsule Take 1 capsule by mouth 3 times daily as needed for Cough 10 capsule 0    guaiFENesin-dextromethorphan (ROBITUSSIN DM) 100-10 MG/5ML syrup Take 5 mLs by mouth 4 times daily as needed for Cough 120 mL 0    albuterol sulfate HFA (PROVENTIL HFA) 108 (90 Base) MCG/ACT inhaler Inhale 2 puffs into the lungs every 4 hours as needed for Wheezing or Shortness of Breath With spacer (and mask if indicated). Thanks.  1 Inhaler 1    azithromycin (ZITHROMAX) 250 MG tablet Take 2 andinterpreted all of the currently available lab results from this visit (if applicable):    Results for orders placed or performed during the hospital encounter of 03/27/20   Rapid Flu Swab   Result Value Ref Range    Rapid Influenza A Ag NEGATIVE NEGATIVE    Rapid Influenza B Ag NEGATIVE NEGATIVE   Strep Screen Group A Throat   Result Value Ref Range    Specimen THROAT     Special Requests NONE     Strep A Direct Screen NEGATIVE         Radiographs (if obtained):  [] The following radiograph was interpreted by myself in the absence of a radiologist:  [x] Radiologist's Report Reviewed:    CXR    Xr Chest Standard (2 Vw)    Result Date: 3/20/2020  EXAMINATION: TWO XRAY VIEWS OF THE CHEST 3/20/2020 12:16 am COMPARISON: None. HISTORY: ORDERING SYSTEM PROVIDED HISTORY: COugh TECHNOLOGIST PROVIDED HISTORY: Reason for exam:->COugh Reason for Exam: COugh Acuity: Acute Type of Exam: Initial FINDINGS: The lungs are clear there is no infiltrate effusion or pneumothorax. The cardiac silhouette is normal.  There are no bony abnormalities seen. Normal chest x-ray       EKG (if obtained): (All EKG's are interpreted by myself in the absence of a cardiologist)    MDM:    Patient here with cough, sore throat, ear pain, drainage, URI symptoms. She is been sick for the past couple days. Was here the other day states she was told she had bronchitis discharged home with medication but states she cannot afford it. Patient states she has had more cough it is nonproductive denies any headache fevers nausea vomiting chest pain shortness of breath abdominal pain pregnancy diarrhea swelling rash. Does have asthma. States she has had some drainage, sore throat, ear pain. Vaccines up-to-date. No travel no sick contacts. She has no coronavirus exposure or positive contacts. She appears remarkably well she is resting in bed she is watching television, listening to her iPhone.   She is in no distress her airway is normal I will swab

## 2020-03-28 NOTE — ED NOTES
Pt states when she coughs her head and chest hurt worse than when she first came in. Pt medicated. Flu and strep swabs collected and sent to lab.       Fadumo Graves RN  03/27/20 [id number]

## 2020-03-30 ENCOUNTER — CARE COORDINATION (OUTPATIENT)
Dept: CARE COORDINATION | Age: 20
End: 2020-03-30

## 2020-03-30 LAB
CULTURE: NORMAL
Lab: NORMAL
SPECIMEN: NORMAL
STREP A DIRECT SCREEN: NEGATIVE

## 2020-03-30 NOTE — CARE COORDINATION
COVID-19 Screening Initial Follow-up Note    Patient contacted regarding [facility]. Care Transition Nurse/ Ambulatory Care Manager contacted the patient by telephone to perform post discharge assessment. Verified name and  with patient as identifiers. Provided introduction to self, and explanation of the CTN/ACM role, and reason for call due to risk factors for infection and/or exposure to COVID-19. Symptoms reviewed with patient who verbalized the following symptoms: cough, shortness of breath and no new/worsening symptoms       Due to no new or worsening symptoms encounter was not routed to provider for escalation. Patient has following risk factors of: asthma. CTN/ACM reviewed discharge instructions, medical action plan and red flags such as increased shortness of breath, increasing fever and signs of decompensation with patient who verbalized understanding. Discussed exposure protocols and quarantine with CDC Guidelines What to do if you are sick with coronavirus disease 2019 Patient who was given an opportunity for questions and concerns. The patient agrees to contact the [facility] exposure line [phone], [facility] department [place] [facility]: ([phone]) and PCP office for questions related to their healthcare. CTN/ACM provided contact information for future reference. Reviewed and educated patient on any new and changed medications related to discharge diagnosis     Plan for follow-up call in 14 days based on severity of symptoms and risk factors    Spoke with pt, she is reporting improvement in symptoms despite not picking up the prescriptions prescribed to her from the ER, pt reports she has no insurance, referred pt to [facility] for assistance with medications. Reviewed instructions to self-quarantine, disinfecting of living area & symptoms for which she would need to return to ER.     Tobin Sultana RN  Ambulatory Care Manager  [phone]

## 2020-04-06 LAB
EKG ATRIAL RATE: 98 BPM
EKG DIAGNOSIS: NORMAL
EKG P AXIS: 54 DEGREES
EKG P-R INTERVAL: 238 MS
EKG Q-T INTERVAL: 332 MS
EKG QRS DURATION: 84 MS
EKG QTC CALCULATION (BAZETT): 423 MS
EKG R AXIS: 54 DEGREES
EKG T AXIS: 17 DEGREES
EKG VENTRICULAR RATE: 98 BPM

## 2020-04-17 ENCOUNTER — CARE COORDINATION (OUTPATIENT)
Dept: CARE COORDINATION | Age: 20
End: 2020-04-17

## 2020-04-25 ENCOUNTER — HOSPITAL ENCOUNTER (EMERGENCY)
Age: 20
Discharge: HOME | End: 2020-04-25
Payer: MEDICARE

## 2020-04-25 VITALS
RESPIRATION RATE: 13 BRPM | HEART RATE: 90 BPM | WEIGHT: 290 LBS | TEMPERATURE: 98.1 F | SYSTOLIC BLOOD PRESSURE: 149 MMHG | DIASTOLIC BLOOD PRESSURE: 88 MMHG | BODY MASS INDEX: 45.52 KG/M2 | HEIGHT: 67 IN | OXYGEN SATURATION: 99 %

## 2020-04-25 PROCEDURE — 99283 EMERGENCY DEPT VISIT LOW MDM: CPT

## 2020-04-25 RX ORDER — IBUPROFEN 600 MG/1
600 TABLET ORAL EVERY 6 HOURS PRN
Qty: 30 TABLET | Refills: 0 | Status: SHIPPED | OUTPATIENT
Start: 2020-04-25 | End: 2021-05-02

## 2020-04-25 RX ORDER — METHOCARBAMOL 500 MG/1
500 TABLET, FILM COATED ORAL 4 TIMES DAILY
Qty: 20 TABLET | Refills: 0 | Status: SHIPPED | OUTPATIENT
Start: 2020-04-25 | End: 2022-05-12

## 2020-04-25 ASSESSMENT — PAIN DESCRIPTION - DESCRIPTORS: DESCRIPTORS: ACHING

## 2020-04-25 ASSESSMENT — PAIN DESCRIPTION - PAIN TYPE: TYPE: ACUTE PAIN

## 2020-04-25 ASSESSMENT — PAIN SCALES - GENERAL: PAINLEVEL_OUTOF10: 8

## 2020-04-25 ASSESSMENT — PAIN DESCRIPTION - LOCATION: LOCATION: BACK

## 2020-04-25 ASSESSMENT — PAIN DESCRIPTION - ORIENTATION: ORIENTATION: RIGHT

## 2020-04-25 NOTE — ED PROVIDER NOTES
negative  See HPI and nursing notes for additional information       PAST MEDICAL & SURGICAL HISTORY    Past Medical History:   Diagnosis Date    Asthma     Depression     Seasonal allergies      History reviewed. No pertinent surgical history. CURRENT MEDICATIONS    Current Outpatient Rx   Medication Sig Dispense Refill    ibuprofen (ADVIL;MOTRIN) 600 MG tablet Take 1 tablet by mouth every 6 hours as needed for Pain 30 tablet 0    methocarbamol (ROBAXIN) 500 MG tablet Take 1 tablet by mouth 4 times daily As needed for muscle spasm. 20 tablet 0    albuterol sulfate HFA (PROVENTIL HFA) 108 (90 Base) MCG/ACT inhaler Inhale 2 puffs into the lungs every 4 hours as needed for Wheezing or Shortness of Breath With spacer (and mask if indicated). Thanks.  1 Inhaler 1    naproxen (NAPROSYN) 500 MG tablet Take 1 tablet by mouth 2 times daily 60 tablet 0    acetaminophen (TYLENOL) 325 MG tablet Take 2 tablets by mouth every 6 hours as needed for Pain 120 tablet 3    Benzocaine-Menthol (CHLORASEPTIC MAX SORE THROAT) 15-10 MG LOZG Take 1 lozenge by mouth every 2 hours as needed (sore throat) 15 lozenge 0       ALLERGIES    No Known Allergies    SOCIAL HISTORY    Social History     Socioeconomic History    Marital status: Single     Spouse name: None    Number of children: None    Years of education: None    Highest education level: None   Occupational History    None   Social Needs    Financial resource strain: None    Food insecurity     Worry: None     Inability: None    Transportation needs     Medical: None     Non-medical: None   Tobacco Use    Smoking status: Current Every Day Smoker     Types: Cigarettes, E-Cigarettes    Smokeless tobacco: Never Used   Substance and Sexual Activity    Alcohol use: Yes     Comment: rare    Drug use: Yes     Types: Marijuana    Sexual activity: Never   Lifestyle    Physical activity     Days per week: None     Minutes per session: None    Stress: None Relationships    Social connections     Talks on phone: None     Gets together: None     Attends Baptism service: None     Active member of club or organization: None     Attends meetings of clubs or organizations: None     Relationship status: None    Intimate partner violence     Fear of current or ex partner: None     Emotionally abused: None     Physically abused: None     Forced sexual activity: None   Other Topics Concern    None   Social History Narrative    None       PHYSICAL EXAM    VITAL SIGNS: BP (!) 152/90   Pulse 93   Temp 98.3 °F (36.8 °C) (Oral)   Resp 16   Ht 5' 7\" (1.702 m)   Wt 290 lb (131.5 kg)   SpO2 100%   BMI 45.42 kg/m²    Patient was noted to be afebrile    Constitutional:  Well developed, well nourished. HENT:  Atraumatic, moist mucus membranes. Full range of motion. Eyes:  No orbital trauma. No scleral icterus. Neck: No JVD, supple. No enlarged lymph nodes. Cardiovascular:  Normal rate, regular rhythm, no murmurs/rubs/gallops  Respiratory:  No respiratory distress, normal breath sounds. Abdomen: Bowel sounds normal, abdomen soft, no tenderness, no masses. Musculoskeletal:  No edema, no deformities. Right knee without bruising, swelling or erythema. Mild discomfort palpation over anterior aspect of knee. She has full range of motion. Sensation intact throughout. Brisk capillary refill of right lower extremity. Extremity well perfused. No skin changes, tenderness or range of motion deficit of right hip or ankle. Back:   - No gross deformity, swelling, or discoloration.    - + paracervical tenderness without masses, fluctuance, warmth, or skin changes.  - No localized midline bony tenderness.   - No change in pain with forward flexion.  - SLR test negative. No CVA tenderness to percussion    Integument:  Well hydrated, no rash, no pallor. Neurologic:    Intact sensation to soft touch posterior portion head/neck.   5/5 strength deltoid, abd/adduction shoulders, biceps flexion, triceps extension, flex/extension wrists, fingers, abd/adduction fingers  Vascular:  Distal pulses and capillary refill intact in bilateral lower extremities          ED COURSE & MEDICAL DECISION MAKING       Vital signs and nursing notes reviewed during ED course. I have independently evaluated this patient. Supervising MD present in the Emergency Department, available for consultation, throughout entirety of patient care. Patient presents as above. She does have elevated blood pressure with otherwise normal vital signs. She is afebrile. She is neurologically intact on exam.  History and exam most consistent with musculoskeletal etiology of symptoms which were exacerbated after standing for long period of time at work today. We discussed symptomatic treatment options. Right knee pain is chronic, had a twisting injury years ago and has had intermittent pain in his knee since then. With no new injury or trauma, do not believe that advanced imaging of back or knee warranted at this time. Will discharge with symptomatic treatment options and close follow-up with primary care provider, also given physical therapy follow-up. Patient to return with any new or worsening symptoms. I estimate there is LOW risk for ABDOMINAL AORTIC ANEURYSM, CAUDA EQUINA SYNDROME, EPIDURAL MASS LESION, SPINAL STENOSIS, OR HERNIATED DISK CAUSING SEVERE STENOSIS, thus I consider the discharge disposition reasonable. Gasper Blackburn and I have discussed the diagnosis and risks, and we agree with discharging home to follow-up with their primary doctor. We also discussed returning to the Emergency Department immediately if new or worsening symptoms occur. We have discussed the symptoms which are most concerning (e.g., saddle anesthesia, urinary or bowel incontinence or retention, changing or worsening pain) that necessitate immediate return. Clinical  IMPRESSION    1.  Back pain, unspecified back

## 2020-12-26 ENCOUNTER — HOSPITAL ENCOUNTER (EMERGENCY)
Age: 20
Discharge: HOME | End: 2020-12-26
Attending: EMERGENCY MEDICINE
Payer: MEDICARE

## 2020-12-26 VITALS
TEMPERATURE: 97.5 F | HEART RATE: 80 BPM | HEIGHT: 68 IN | DIASTOLIC BLOOD PRESSURE: 100 MMHG | BODY MASS INDEX: 43.95 KG/M2 | SYSTOLIC BLOOD PRESSURE: 165 MMHG | WEIGHT: 290 LBS | RESPIRATION RATE: 16 BRPM

## 2020-12-26 LAB
ACETAMINOPHEN LEVEL: <5 UG/ML (ref 15–30)
ALBUMIN SERPL-MCNC: 4.1 GM/DL (ref 3.4–5)
ALCOHOL SCREEN SERUM: <0.01 %WT/VOL
ALP BLD-CCNC: 95 IU/L (ref 40–128)
ALT SERPL-CCNC: 22 U/L (ref 10–40)
AMPHETAMINES: NEGATIVE
ANION GAP SERPL CALCULATED.3IONS-SCNC: 6 MMOL/L (ref 4–16)
AST SERPL-CCNC: 18 IU/L (ref 15–37)
BACTERIA: NEGATIVE /HPF
BARBITURATE SCREEN URINE: NEGATIVE
BASOPHILS ABSOLUTE: 0.1 K/CU MM
BASOPHILS RELATIVE PERCENT: 0.6 % (ref 0–1)
BENZODIAZEPINE SCREEN, URINE: NEGATIVE
BILIRUB SERPL-MCNC: 0.3 MG/DL (ref 0–1)
BILIRUBIN URINE: NEGATIVE MG/DL
BLOOD, URINE: NEGATIVE
BUN BLDV-MCNC: 10 MG/DL (ref 6–23)
CALCIUM SERPL-MCNC: 9.1 MG/DL (ref 8.3–10.6)
CANNABINOID SCREEN URINE: ABNORMAL
CHLORIDE BLD-SCNC: 102 MMOL/L (ref 99–110)
CLARITY: CLEAR
CO2: 27 MMOL/L (ref 21–32)
COCAINE METABOLITE: NEGATIVE
COLOR: YELLOW
CREAT SERPL-MCNC: 0.7 MG/DL (ref 0.6–1.1)
DIFFERENTIAL TYPE: ABNORMAL
DOSE AMOUNT: ABNORMAL
DOSE AMOUNT: ABNORMAL
DOSE TIME: ABNORMAL
DOSE TIME: ABNORMAL
EOSINOPHILS ABSOLUTE: 0.2 K/CU MM
EOSINOPHILS RELATIVE PERCENT: 2.3 % (ref 0–3)
GFR AFRICAN AMERICAN: >60 ML/MIN/1.73M2
GFR NON-AFRICAN AMERICAN: >60 ML/MIN/1.73M2
GLUCOSE BLD-MCNC: 95 MG/DL (ref 70–99)
GLUCOSE, URINE: NEGATIVE MG/DL
HCT VFR BLD CALC: 40.7 % (ref 37–47)
HEMOGLOBIN: 12.5 GM/DL (ref 12.5–16)
IMMATURE NEUTROPHIL %: 0.2 % (ref 0–0.43)
KETONES, URINE: NEGATIVE MG/DL
LEUKOCYTE ESTERASE, URINE: NEGATIVE
LYMPHOCYTES ABSOLUTE: 1.8 K/CU MM
LYMPHOCYTES RELATIVE PERCENT: 21.3 % (ref 24–44)
MCH RBC QN AUTO: 27 PG (ref 27–31)
MCHC RBC AUTO-ENTMCNC: 30.7 % (ref 32–36)
MCV RBC AUTO: 87.9 FL (ref 78–100)
MONOCYTES ABSOLUTE: 0.5 K/CU MM
MONOCYTES RELATIVE PERCENT: 5.6 % (ref 0–4)
NITRITE URINE, QUANTITATIVE: NEGATIVE
NUCLEATED RBC %: 0 %
OPIATES, URINE: NEGATIVE
OXYCODONE: NEGATIVE
PDW BLD-RTO: 13.4 % (ref 11.7–14.9)
PH, URINE: 7 (ref 5–8)
PHENCYCLIDINE, URINE: NEGATIVE
PLATELET # BLD: 217 K/CU MM (ref 140–440)
PMV BLD AUTO: 9.8 FL (ref 7.5–11.1)
POTASSIUM SERPL-SCNC: 3.9 MMOL/L (ref 3.5–5.1)
PROTEIN UA: NEGATIVE MG/DL
RBC # BLD: 4.63 M/CU MM (ref 4.2–5.4)
RBC URINE: NORMAL /HPF (ref 0–6)
SALICYLATE LEVEL: <0.3 MG/DL (ref 15–30)
SEGMENTED NEUTROPHILS ABSOLUTE COUNT: 5.7 K/CU MM
SEGMENTED NEUTROPHILS RELATIVE PERCENT: 70 % (ref 36–66)
SODIUM BLD-SCNC: 135 MMOL/L (ref 135–145)
SPECIFIC GRAVITY UA: 1.02 (ref 1–1.03)
SQUAMOUS EPITHELIAL: 1 /HPF
TOTAL IMMATURE NEUTOROPHIL: 0.02 K/CU MM
TOTAL NUCLEATED RBC: 0 K/CU MM
TOTAL PROTEIN: 8 GM/DL (ref 6.4–8.2)
TRICHOMONAS: NORMAL /HPF
UROBILINOGEN, URINE: NORMAL MG/DL (ref 0.2–1)
WBC # BLD: 8.2 K/CU MM (ref 4–10.5)
WBC UA: <1 /HPF (ref 0–5)

## 2020-12-26 PROCEDURE — 80053 COMPREHEN METABOLIC PANEL: CPT

## 2020-12-26 PROCEDURE — 81001 URINALYSIS AUTO W/SCOPE: CPT

## 2020-12-26 PROCEDURE — 99284 EMERGENCY DEPT VISIT MOD MDM: CPT

## 2020-12-26 PROCEDURE — 6370000000 HC RX 637 (ALT 250 FOR IP): Performed by: EMERGENCY MEDICINE

## 2020-12-26 PROCEDURE — G0480 DRUG TEST DEF 1-7 CLASSES: HCPCS

## 2020-12-26 PROCEDURE — 85025 COMPLETE CBC W/AUTO DIFF WBC: CPT

## 2020-12-26 PROCEDURE — 80307 DRUG TEST PRSMV CHEM ANLYZR: CPT

## 2020-12-26 RX ORDER — HYDROXYZINE HYDROCHLORIDE 25 MG/1
25 TABLET, FILM COATED ORAL EVERY 8 HOURS PRN
Qty: 30 TABLET | Refills: 0 | Status: SHIPPED | OUTPATIENT
Start: 2020-12-26 | End: 2021-01-05

## 2020-12-26 RX ORDER — HYDROXYZINE PAMOATE 25 MG/1
25 CAPSULE ORAL ONCE
Status: COMPLETED | OUTPATIENT
Start: 2020-12-26 | End: 2020-12-26

## 2020-12-26 RX ADMIN — HYDROXYZINE PAMOATE 25 MG: 25 CAPSULE ORAL at 06:27

## 2020-12-26 ASSESSMENT — ENCOUNTER SYMPTOMS
RHINORRHEA: 0
VOMITING: 0
SHORTNESS OF BREATH: 0
ABDOMINAL PAIN: 0
SORE THROAT: 0
COUGH: 0
EYE PAIN: 0
NAUSEA: 0
EYE DISCHARGE: 0
BACK PAIN: 0

## 2020-12-26 NOTE — ED PROVIDER NOTES
[address]      Pt Name: Renae Paulino  MRN: [id number]  [place] 2000  Date of evaluation: 12/26/2020  Provider: Divya Roldan MD    CHIEF COMPLAINT       Chief Complaint   Patient presents with   [address] Problem     racing thoughts         HISTORY OF PRESENT ILLNESS      Renae Paulino is a 23 y.o. female who presents to the emergency department  for   Chief Complaint   Patient presents with   [address] Problem     racing thoughts       23 yof presents reporting anxiety. She currently follows with Dr. Ciro Pallas at the [address] [place] [address]. She recently was started on Abilify for her mood disorder diagnoses and has had a mediation increase. She endorses adherence with taking the Abilify. She states that 2 days ago she had an anxiety attack and that since then she has had some difficulty concentrating. No SI, HI, AVH. No lethality. No thoughts or actions towards self harm or harm of others. She states that she is out of her hydroxyzine which typically helps with her anxiety. She comes to the ED voluntarily. She is requesting assistance with resources to help manage her anxiety. She has not yet scheduled an appointment with her established mental health provider. She has no physical complaints in the emergency department. She is alert and oriented x3. She is answering questions well moving her extremity spontaneously. She denies any alcohol or illicit drug use. Nursing Notes, Triage Notes & Vital Signs were reviewed. REVIEW OF SYSTEMS    (2-9 systems for level 4, 10 or more for level 5)     Review of Systems   Constitutional: Negative for chills and fever. HENT: Negative for congestion, rhinorrhea and sore throat. Eyes: Negative for pain and discharge. Respiratory: Negative for cough and shortness of breath. Cardiovascular: Negative for chest pain and palpitations. Gastrointestinal: Negative for abdominal pain, nausea and vomiting. Endocrine: Negative for polydipsia and polyuria. Genitourinary: Negative for dysuria and flank pain. Musculoskeletal: Negative for back pain and neck pain. Skin: Negative for pallor and wound. Neurological: Negative for dizziness, facial asymmetry, light-headedness, numbness and headaches. Psychiatric/Behavioral: Negative for confusion, hallucinations, self-injury and suicidal ideas. Except as noted above the remainder of the review of systems was reviewed and negative. PAST MEDICAL HISTORY     Past Medical History:   Diagnosis Date    Asthma     Depression     Seasonal allergies        Prior to Admission medications    Medication Sig Start Date End Date Taking? Authorizing Provider   hydrOXYzine (ATARAX) 25 MG tablet Take 1 tablet by mouth every 8 hours as needed for Anxiety 12/26/20 1/5/21 Yes Jaime Corona MD   ibuprofen (ADVIL;MOTRIN) 600 MG tablet Take 1 tablet by mouth every 6 hours as needed for Pain 4/25/20   KITA Maldonado   methocarbamol (ROBAXIN) 500 MG tablet Take 1 tablet by mouth 4 times daily As needed for muscle spasm. 4/25/20   KITA Maldonado   albuterol sulfate HFA (PROVENTIL HFA) 108 (90 Base) MCG/ACT inhaler Inhale 2 puffs into the lungs every 4 hours as needed for Wheezing or Shortness of Breath With spacer (and mask if indicated). Thanks. 3/27/20 4/26/20  Tanisha Watkins, DO   naproxen (NAPROSYN) 500 MG tablet Take 1 tablet by mouth 2 times daily 3/27/20   Tanisha Watkins, DO   acetaminophen (TYLENOL) 325 MG tablet Take 2 tablets by mouth every 6 hours as needed for Pain 3/27/20   Tanisha Watkins, DO   Benzocaine-Menthol (CHLORASEPTIC MAX SORE THROAT) 15-10 MG LOZG Take 1 lozenge by mouth every 2 hours as needed (sore throat) 3/17/19   Michelle Casillas PA-C        There is no problem list on file for this patient.         SURGICAL HISTORY     No past surgical history on file.      CURRENT MEDICATIONS       Discharge Medication List as of 12/26/2020  6:32 AM      CONTINUE these medications which have NOT CHANGED    Details   ibuprofen (ADVIL;MOTRIN) 600 MG tablet Take 1 tablet by mouth every 6 hours as needed for Pain, Disp-30 tablet, R-0Print      methocarbamol (ROBAXIN) 500 MG tablet Take 1 tablet by mouth 4 times daily As needed for muscle spasm., Disp-20 tablet, R-0Print      albuterol sulfate HFA (PROVENTIL HFA) 108 (90 Base) MCG/ACT inhaler Inhale 2 puffs into the lungs every 4 hours as needed for Wheezing or Shortness of Breath With spacer (and mask if indicated). Thanks. , Disp-1 Inhaler, R-1Print      naproxen (NAPROSYN) 500 MG tablet Take 1 tablet by mouth 2 times daily, Disp-60 tablet, R-0Print      acetaminophen (TYLENOL) 325 MG tablet Take 2 tablets by mouth every 6 hours as needed for Pain, Disp-120 tablet, R-3Print      Benzocaine-Menthol (CHLORASEPTIC MAX SORE THROAT) 15-10 MG LOZG Take 1 lozenge by mouth every 2 hours as needed (sore throat), Disp-15 lozenge, R-0Print             ALLERGIES     Patient has no known allergies. FAMILY HISTORY     No family history on file.        SOCIAL HISTORY       Social History     Socioeconomic History    Marital status: Single     Spouse name: Not on file    Number of children: Not on file    Years of education: Not on file    Highest education level: Not on file   Occupational History    Not on file   Social Needs    Financial resource strain: Not on file    Food insecurity     Worry: Not on file     Inability: Not on file    Transportation needs     Medical: Not on file     Non-medical: Not on file   Tobacco Use    Smoking status: Current Every Day Smoker     Types: Cigarettes, E-Cigarettes    Smokeless tobacco: Never Used   Substance and Sexual Activity    Alcohol use: Yes     Comment: rare    Drug use: Yes     Types: Marijuana    Sexual activity: Never   Lifestyle    Physical activity     Days per week: Not on file     Minutes per session: Not on file    Stress: Not on file   Relationships    Social connections     Talks on phone: Not on file     Gets together: Not on file     Attends Scientology service: Not on file     Active member of club or organization: Not on file     Attends meetings of clubs or organizations: Not on file     Relationship status: Not on file    Intimate partner violence     Fear of current or ex partner: Not on file     Emotionally abused: Not on file     Physically abused: Not on file     Forced sexual activity: Not on file   Other Topics Concern    Not on file   Social History Narrative    Not on file       SCREENINGS    [place] Coma Scale  Eye Opening: Spontaneous  Best Verbal Response: Oriented  Best Motor Response: Obeys commands  [place] Coma Scale Score: 15          PHYSICAL EXAM    (up to 7 for level 4, 8 or more for level 5)     ED Triage Vitals [12/26/20 0543]   BP Temp Temp src Pulse Resp SpO2 Height Weight   (!) 165/100 97.5 °F (36.4 °C) -- 80 16 -- 5' 8\" (1.727 m) 290 lb (131.5 kg)       Physical Exam  Vitals signs reviewed. Constitutional:       Appearance: She is not ill-appearing or toxic-appearing. HENT:      Head: Normocephalic and atraumatic. Nose: No congestion or rhinorrhea. Mouth/Throat:      Mouth: Mucous membranes are dry. Pharynx: No oropharyngeal exudate or posterior oropharyngeal erythema. Eyes:      General:         Right eye: No discharge. Left eye: No discharge. Extraocular Movements: Extraocular movements intact. Pupils: Pupils are equal, round, and reactive to light. Neck:      Musculoskeletal: Normal range of motion and neck supple. No muscular tenderness. Cardiovascular:      Rate and Rhythm: Normal rate. Heart sounds: No friction rub. No gallop. Pulmonary:      Effort: Pulmonary effort is normal. No respiratory distress.       Comments: Respirations unlabored  No retractions, no increased work of breathing  Chest:      Chest wall: No tenderness. Abdominal:      Palpations: Abdomen is soft. Tenderness: There is no abdominal tenderness. There is no guarding. Comments: Abdomen soft, non-tender, non-peritoneal  No guarding on abdominal exam   Musculoskeletal:         General: No tenderness. Right lower leg: No edema. Left lower leg: No edema. Lymphadenopathy:      Cervical: No cervical adenopathy. Skin:     General: Skin is warm. Capillary Refill: Capillary refill takes less than 2 seconds. Findings: No erythema, lesion or rash. Neurological:      General: No focal deficit present. Mental Status: She is alert and oriented to person, place, and time. DIAGNOSTIC RESULTS     Labs Reviewed   CBC WITH AUTO DIFFERENTIAL - Abnormal; Notable for the following components:       Result Value    MCHC 30.7 (*)     Segs Relative 70.0 (*)     Lymphocytes % 21.3 (*)     Monocytes % 5.6 (*)     All other components within normal limits   SALICYLATE LEVEL - Abnormal; Notable for the following components:    Salicylate Lvl <8.2 (*)     All other components within normal limits   ACETAMINOPHEN LEVEL - Abnormal; Notable for the following components:    Acetaminophen Level <5.0 (*)     All other components within normal limits   URINE DRUG SCREEN - Abnormal; Notable for the following components:    Cannabinoid Scrn, Ur UNCONFIRMED POSITIVE (*)     All other components within normal limits   COMPREHENSIVE METABOLIC PANEL   ETHANOL   URINALYSIS          RADIOLOGY:     Non-plain film images such as CT, Ultrasound and MRI are read by the radiologist. Plain radiographic images are visualized and preliminarily interpreted by the emergency physician.        Interpretation per the Radiologist below, if available at the time of this note:    No orders to display         ED BEDSIDE ULTRASOUND:   Performed by ED Physician Clemencia Clarke MD       LABS:  Labs Reviewed   CBC WITH AUTO DIFFERENTIAL - Abnormal; Notable for the following components:       Result Value    MCHC 30.7 (*)     Segs Relative 70.0 (*)     Lymphocytes % 21.3 (*)     Monocytes % 5.6 (*)     All other components within normal limits   SALICYLATE LEVEL - Abnormal; Notable for the following components:    Salicylate Lvl <3.9 (*)     All other components within normal limits   ACETAMINOPHEN LEVEL - Abnormal; Notable for the following components:    Acetaminophen Level <5.0 (*)     All other components within normal limits   URINE DRUG SCREEN - Abnormal; Notable for the following components:    Cannabinoid Scrn, Ur UNCONFIRMED POSITIVE (*)     All other components within normal limits   COMPREHENSIVE METABOLIC PANEL   ETHANOL   URINALYSIS       All other labs were within normal range or not returned as of this dictation. EMERGENCY DEPARTMENT COURSE and DIFFERENTIAL DIAGNOSIS/MDM:   Vitals:    Vitals:    12/26/20 0543   BP: (!) 165/100   Pulse: 80   Resp: 16   Temp: 97.5 °F (36.4 °C)   Weight: 290 lb (131.5 kg)   Height: 5' 8\" (1.727 m)           MDM  Number of Diagnoses or Management Options  Anxiety  Diagnosis management comments: 8year-old female presents for anxiety. She does follow with a mental health physician at the [facility]. She was recently prescribed Abilify and has had a dose increase. She does endorse adherence with her Abilify. She reports that she had an anxiety attack 2 days ago. Since anxiety attack has had some difficulty concentrating. She presents requesting resources to help her manage her anxiety. She was previously on hydroxyzine but is out of that medication. Denies any SI, HI or AVH. No lethality. Denies any actions or thoughts toward self-harm or harm of others. She is alert and oriented answer questions appropriately. She is able to contract for safety at this time. She has not yet scheduled an outpatient appointment with her mental health provider.   Blood pressure is elevated emergency department. Vitals otherwise unremarkable. Physical exam is nonacute. At this time, she does not represent high risk mental health case. She has no lethality. She presents the emergency department voluntarily. She is adherent with her medication does have an established provider with whom she can comfortably follow-up. She does report that taking the hydroxyzine is helpful in having her manage her anxiety. She will be prescribed a course of hydroxyzine as a bridge to her following up outpatient. She will see her mental health provider in a few days. She is agreeable plan of care. Strict return precautions for worsening or concerning symptoms are discussed. She is discharged home in stable condition.          -  Patient seen and evaluated in the emergency department. -  Triage and nursing notes reviewed and incorporated. -  Old chart records reviewed and incorporated. -  Work-up included:  See above  -  Results discussed with patient. CONSULTS:  None    PROCEDURES:  None performed unless otherwise noted below     Procedures        FINAL IMPRESSION      1. Anxiety          DISPOSITION/PLAN   DISPOSITION Decision To Discharge 12/26/2020 06:31:36 AM      PATIENT REFERRED TO:  [facility] [address]  [phone]  In 2 days  Please follow-up with Dr. Megan Murillo at the [address] [place] [address] for further medication management. DISCHARGE MEDICATIONS:  Discharge Medication List as of 12/26/2020  6:32 AM      START taking these medications    Details   hydrOXYzine (ATARAX) 25 MG tablet Take 1 tablet by mouth every 8 hours as needed for Anxiety, Disp-30 tablet, R-0Print             ED Provider Disposition Time  DISPOSITION Decision To Discharge 12/26/2020 06:31:36 AM      Appropriate personal protective equipment was worn during the patient's evaluation. These included surgical, eye protection, surgical mask or in 95 respirator and gloves.   The

## 2020-12-26 NOTE — ED NOTES
Reviewed discharge instructions with pt, pt V/U. Pt discharged home.      Loco Valentino RN  12/26/20 6324

## 2020-12-26 NOTE — LETTER
[facility] Emergency Department  [address] [address]  Phone: [phone]  Fax: [phone]         December 26, 2020     Patient: Nestor Crump   YOB: 2000   Date of Visit: 12/26/2020       To Whom It May Concern:    Holland Fritz was seen and treated in our emergency department on 12/26/2020. The following work duties are recommended. She may return to work on 12/29/20    Treatment and work recommendations given by the emergency department are initial emergency measures only, and follow up should be arranged as soon as possible with the company's occupational health provider* or the specialist to whom the worker was referred. *If the company does not have an occupational health provider, follow up should be at [address] [place] [address] Aziza Diaz  [phone]  In 2 days  Please follow-up with Dr. Winsome Thomson at the [address] [place] [address] for further medication management.           Sincerely,        Shabbir Al RN        Signature:__________________________________

## 2020-12-26 NOTE — ED TRIAGE NOTES
Pt denies any thoughts of harming herself or others, states she is having racing thoughts and cant focus

## 2020-12-26 NOTE — ED NOTES
Pt states that she needs a new counselor, she would like to meet with case management for community resources in reference to her current seasonal depression and anxiety.       Negro Pedersen RN  12/26/20 2838

## 2021-01-15 ENCOUNTER — APPOINTMENT (OUTPATIENT)
Dept: GENERAL RADIOLOGY | Age: 21
End: 2021-01-15
Payer: MEDICARE

## 2021-01-15 ENCOUNTER — HOSPITAL ENCOUNTER (EMERGENCY)
Age: 21
Discharge: HOME | End: 2021-01-15
Payer: MEDICARE

## 2021-01-15 VITALS
BODY MASS INDEX: 38.65 KG/M2 | TEMPERATURE: 97.5 F | OXYGEN SATURATION: 99 % | HEIGHT: 70 IN | HEART RATE: 92 BPM | SYSTOLIC BLOOD PRESSURE: 145 MMHG | DIASTOLIC BLOOD PRESSURE: 72 MMHG | RESPIRATION RATE: 18 BRPM | WEIGHT: 270 LBS

## 2021-01-15 DIAGNOSIS — F41.1 ANXIETY STATE: Primary | ICD-10-CM

## 2021-01-15 PROCEDURE — 6370000000 HC RX 637 (ALT 250 FOR IP): Performed by: PHYSICIAN ASSISTANT

## 2021-01-15 PROCEDURE — 93005 ELECTROCARDIOGRAM TRACING: CPT | Performed by: PHYSICIAN ASSISTANT

## 2021-01-15 PROCEDURE — 71045 X-RAY EXAM CHEST 1 VIEW: CPT

## 2021-01-15 PROCEDURE — 99283 EMERGENCY DEPT VISIT LOW MDM: CPT

## 2021-01-15 RX ORDER — LORAZEPAM 1 MG/1
1 TABLET ORAL ONCE
Status: COMPLETED | OUTPATIENT
Start: 2021-01-15 | End: 2021-01-15

## 2021-01-15 RX ADMIN — LORAZEPAM 1 MG: 1 TABLET ORAL at 16:50

## 2021-01-15 NOTE — ED TRIAGE NOTES
Patient states she has been trying to get into mental health d/t depression but she slept through her appointment

## 2021-01-15 NOTE — ED PROVIDER NOTES
EMERGENCY DEPARTMENT ENCOUNTER      PCP: Morena Lucas MD    [address] [facility]    Chief Complaint   Patient presents with    Anxiety     hx of anxiety, today while dealing with upset customer, pt became very anxious       Of note, this patient was not evaluated by attending physician. Attending physician was available for consultation. HPI    Hernan Kulkarni is a 23 y.o. female who presents to the emergency department today and anxiety state. Patient states that she was working and got a verbal altercation with a customer. She states that this \"set off my anxiety\". She states that she has been crying, feeling depressed. She states she been really anxious. States that she felt a pain in her chest and felt short of breath during this episode. She takes Vistaril for anxiety, she states her trachea did not seem to help. She not actively having any chest pain. On my evaluation patient is sleeping, does not appear to be overly anxious. She has no other significant complaints. REVIEW OF SYSTEMS    At least 4 systems reviewed and otherwise acutely negative except as in the [address] All other review of systems are negative  See HPI and nursing notes for additional information     PAST MEDICAL AND SURGICAL HISTORY    Past Medical History:   Diagnosis Date    Asthma     Depression     Seasonal allergies      History reviewed. No pertinent surgical history. CURRENT MEDICATIONS    Current Outpatient Rx   Medication Sig Dispense Refill    ibuprofen (ADVIL;MOTRIN) 600 MG tablet Take 1 tablet by mouth every 6 hours as needed for Pain 30 tablet 0    methocarbamol (ROBAXIN) 500 MG tablet Take 1 tablet by mouth 4 times daily As needed for muscle spasm. 20 tablet 0    albuterol sulfate HFA (PROVENTIL HFA) 108 (90 Base) MCG/ACT inhaler Inhale 2 puffs into the lungs every 4 hours as needed for Wheezing or Shortness of Breath With spacer (and mask if indicated). Thanks.  1 Inhaler 1  naproxen (NAPROSYN) 500 MG tablet Take 1 tablet by mouth 2 times daily 60 tablet 0    acetaminophen (TYLENOL) 325 MG tablet Take 2 tablets by mouth every 6 hours as needed for Pain 120 tablet 3    Benzocaine-Menthol (CHLORASEPTIC MAX SORE THROAT) 15-10 MG LOZG Take 1 lozenge by mouth every 2 hours as needed (sore throat) 15 lozenge 0       ALLERGIES    No Known Allergies    SOCIAL AND FAMILY HISTORY    Social History     Socioeconomic History    Marital status: Single     Spouse name: None    Number of children: None    Years of education: None    Highest education level: None   Occupational History    None   Social Needs    Financial resource strain: None    Food insecurity     Worry: None     Inability: None    Transportation needs     Medical: None     Non-medical: None   Tobacco Use    Smoking status: Current Every Day Smoker     Types: Cigarettes, E-Cigarettes    Smokeless tobacco: Never Used   Substance and Sexual Activity    Alcohol use: Yes     Comment: rare    Drug use: Not Currently     Types: Marijuana    Sexual activity: Never   Lifestyle    Physical activity     Days per week: None     Minutes per session: None    Stress: None   Relationships    Social connections     Talks on phone: None     Gets together: None     Attends Jew service: None     Active member of club or organization: None     Attends meetings of clubs or organizations: None     Relationship status: None    Intimate partner violence     Fear of current or ex partner: None     Emotionally abused: None     Physically abused: None     Forced sexual activity: None   Other Topics Concern    None   Social History Narrative    None     History reviewed. No pertinent family history.     PHYSICAL EXAM    VITAL SIGNS: BP (!) 145/72   Pulse 92   Temp 97.5 °F (36.4 °C) (Oral)   Resp 18   Ht 5' 10\" (1.778 m)   Wt 270 lb (122.5 kg)   SpO2 99%   BMI 38.74 kg/m² Constitutional:  Well developed, Well nourished. No distress  HENT:  Normocephalic, Atraumatic, PERRL. EOMI. Sclera clear. Conjunctiva normal, No discharge. Neck/Lymphatics: supple, no JVD, no swollen nodes  Cardiovascular:   RRR,  no murmurs/rubs/gallops. No JVD  No carotid bruits or murmurs heard in carotids. Respiratory:  Nonlabored breathing. Normal breath sounds, No wheezing  Abdomen: Bowel sounds normal, Soft, No tenderness, no masses. Musculoskeletal:    There is no edema, asymmetry, or calf / thigh tenderness bilaterally. No cyanosis. No cool or pale-appearing limb. Distal cap refill and pulses intact bilateral upper and lower extremities  Bilateral upper and lower extremity ROM intact without pain or obvious deficit  Integument:  Warm, Dry  Neurologic: Alert & oriented , No focal deficits noted. Cranial nerves II through XII grossly intact. Normal gross motor coordination & motor strength bilateral upper and lower extremities  Sensation intact. Psychiatric:  Affect normal, Mood normal.     EKG    See supervising physician's note for EKG interpretation    RADIOLOGY    Xr Chest Portable    Result Date: 1/15/2021  EXAMINATION: ONE XRAY VIEW OF THE CHEST 1/15/2021 4:26 pm COMPARISON: 03/27/2020 HISTORY: Reason for Exam: Chest pain, anxiety state Acuity: Acute Type of Exam: Initial FINDINGS: The lungs are without acute focal process. No effusion or pneumothorax. The cardiomediastinal silhouette is normal.  The osseous structures are intact without acute process.      Negative chest.     ED COURSE & MEDICAL DECISION MAKING Patient presents as above. Patient presents after getting into a verbal altercation with a customer at work. She states that set off her anxiety, she said she had episodes of crying, was unable to console herself. States that she had some chest discomfort and felt short of breath. Otherwise her vital signs are stable, she was resting/not appearing anxious during my encounter. She has no other thoughts of wanting to hurt herself anyone around her, she is on Vistaril. We will send a screening EKG, chest x-ray, she will be given something for anxiety. She is EKG and chest x-ray are reassuring, on reevaluation patient has normal vital signs not significantly anxious appearing. At this point we will advise her to continue her Vistaril, will give her some time from work. Will advise strict return precautions for any new or developing symptoms. She will be discharged home in stable condition. Patient agrees to return emergency department if symptoms worsen or any new symptoms develop. Vital signs and nursing notes reviewed during ED course. Clinical  IMPRESSION    1. Anxiety state      Comment: Please note this report has been produced using speech recognition software and may contain errors related to that system including errors in grammar, punctuation, and spelling, as well as words and phrases that may be inappropriate. If there are any questions or concerns please feel free to contact the dictating provider for clarification.            Kristopher Ferro 411, PA  01/15/21 3367

## 2021-01-15 NOTE — ED PROVIDER NOTES
EKG:  Sinus rhythm with marked sinus arrhythmia, rate 63 beats minute, normal intervals. No ST elevation. Otherwise normal EKG. No previous to compare.      Zaki Velásquez MD  01/15/21 4345

## 2021-01-16 PROCEDURE — 93010 ELECTROCARDIOGRAM REPORT: CPT | Performed by: INTERNAL MEDICINE

## 2021-01-20 LAB
EKG ATRIAL RATE: 63 BPM
EKG DIAGNOSIS: NORMAL
EKG P AXIS: 52 DEGREES
EKG P-R INTERVAL: 202 MS
EKG Q-T INTERVAL: 374 MS
EKG QRS DURATION: 90 MS
EKG QTC CALCULATION (BAZETT): 382 MS
EKG R AXIS: 58 DEGREES
EKG T AXIS: 17 DEGREES
EKG VENTRICULAR RATE: 63 BPM

## 2021-02-06 ENCOUNTER — APPOINTMENT (OUTPATIENT)
Dept: ULTRASOUND IMAGING | Age: 21
End: 2021-02-06
Payer: MEDICARE

## 2021-02-06 ENCOUNTER — APPOINTMENT (OUTPATIENT)
Dept: CT IMAGING | Age: 21
End: 2021-02-06
Payer: MEDICARE

## 2021-02-06 ENCOUNTER — HOSPITAL ENCOUNTER (EMERGENCY)
Age: 21
Discharge: HOME | End: 2021-02-06
Payer: MEDICARE

## 2021-02-06 VITALS
OXYGEN SATURATION: 98 % | WEIGHT: 230 LBS | TEMPERATURE: 98.3 F | DIASTOLIC BLOOD PRESSURE: 61 MMHG | HEART RATE: 86 BPM | HEIGHT: 69 IN | RESPIRATION RATE: 16 BRPM | BODY MASS INDEX: 34.07 KG/M2 | SYSTOLIC BLOOD PRESSURE: 119 MMHG

## 2021-02-06 DIAGNOSIS — E87.1 HYPONATREMIA: ICD-10-CM

## 2021-02-06 DIAGNOSIS — Z32.02 NEGATIVE PREGNANCY TEST: Primary | ICD-10-CM

## 2021-02-06 LAB
ABO/RH: NORMAL
ALBUMIN SERPL-MCNC: 3.5 GM/DL (ref 3.4–5)
ALP BLD-CCNC: 99 IU/L (ref 40–129)
ALT SERPL-CCNC: 21 U/L (ref 10–40)
ANION GAP SERPL CALCULATED.3IONS-SCNC: 9 MMOL/L (ref 4–16)
AST SERPL-CCNC: 17 IU/L (ref 15–37)
BACTERIA: NEGATIVE /HPF
BASOPHILS ABSOLUTE: 0 K/CU MM
BASOPHILS RELATIVE PERCENT: 0.4 % (ref 0–1)
BILIRUB SERPL-MCNC: 0.3 MG/DL (ref 0–1)
BILIRUBIN URINE: NEGATIVE MG/DL
BLOOD, URINE: NEGATIVE
BUN BLDV-MCNC: 12 MG/DL (ref 6–23)
CALCIUM SERPL-MCNC: 9.1 MG/DL (ref 8.3–10.6)
CHLORIDE BLD-SCNC: 96 MMOL/L (ref 99–110)
CLARITY: CLEAR
CO2: 23 MMOL/L (ref 21–32)
COLOR: YELLOW
CREAT SERPL-MCNC: 0.7 MG/DL (ref 0.6–1.1)
DIFFERENTIAL TYPE: ABNORMAL
EOSINOPHILS ABSOLUTE: 0.3 K/CU MM
EOSINOPHILS RELATIVE PERCENT: 2.8 % (ref 0–3)
GFR AFRICAN AMERICAN: >60 ML/MIN/1.73M2
GFR NON-AFRICAN AMERICAN: >60 ML/MIN/1.73M2
GLUCOSE BLD-MCNC: 132 MG/DL (ref 70–99)
GLUCOSE, URINE: NEGATIVE MG/DL
GONADOTROPIN, CHORIONIC (HCG) QUANT: <0.5 UIU/ML
HCT VFR BLD CALC: 41.2 % (ref 37–47)
HEMOGLOBIN: 12.4 GM/DL (ref 12.5–16)
IMMATURE NEUTROPHIL %: 0.2 % (ref 0–0.43)
KETONES, URINE: NEGATIVE MG/DL
LEUKOCYTE ESTERASE, URINE: ABNORMAL
LIPASE: 25 IU/L (ref 13–60)
LYMPHOCYTES ABSOLUTE: 2 K/CU MM
LYMPHOCYTES RELATIVE PERCENT: 19.5 % (ref 24–44)
MCH RBC QN AUTO: 26.4 PG (ref 27–31)
MCHC RBC AUTO-ENTMCNC: 30.1 % (ref 32–36)
MCV RBC AUTO: 87.7 FL (ref 78–100)
MONOCYTES ABSOLUTE: 0.5 K/CU MM
MONOCYTES RELATIVE PERCENT: 4.7 % (ref 0–4)
NITRITE URINE, QUANTITATIVE: NEGATIVE
NUCLEATED RBC %: 0 %
PDW BLD-RTO: 13.5 % (ref 11.7–14.9)
PH, URINE: 6 (ref 5–8)
PLATELET # BLD: 222 K/CU MM (ref 140–440)
PMV BLD AUTO: 9.8 FL (ref 7.5–11.1)
POTASSIUM SERPL-SCNC: 3.3 MMOL/L (ref 3.5–5.1)
PROTEIN UA: NEGATIVE MG/DL
RBC # BLD: 4.7 M/CU MM (ref 4.2–5.4)
RBC URINE: 3 /HPF (ref 0–6)
SEGMENTED NEUTROPHILS ABSOLUTE COUNT: 7.6 K/CU MM
SEGMENTED NEUTROPHILS RELATIVE PERCENT: 72.4 % (ref 36–66)
SODIUM BLD-SCNC: 128 MMOL/L (ref 135–145)
SPECIFIC GRAVITY UA: 1.02 (ref 1–1.03)
SQUAMOUS EPITHELIAL: 4 /HPF
TOTAL IMMATURE NEUTOROPHIL: 0.02 K/CU MM
TOTAL NUCLEATED RBC: 0 K/CU MM
TOTAL PROTEIN: 7.3 GM/DL (ref 6.4–8.2)
TRICHOMONAS: ABNORMAL /HPF
UROBILINOGEN, URINE: NEGATIVE MG/DL (ref 0.2–1)
WBC # BLD: 10.5 K/CU MM (ref 4–10.5)
WBC UA: 3 /HPF (ref 0–5)

## 2021-02-06 PROCEDURE — 80053 COMPREHEN METABOLIC PANEL: CPT

## 2021-02-06 PROCEDURE — 84702 CHORIONIC GONADOTROPIN TEST: CPT

## 2021-02-06 PROCEDURE — 36415 COLL VENOUS BLD VENIPUNCTURE: CPT

## 2021-02-06 PROCEDURE — 86901 BLOOD TYPING SEROLOGIC RH(D): CPT

## 2021-02-06 PROCEDURE — 83690 ASSAY OF LIPASE: CPT

## 2021-02-06 PROCEDURE — 86900 BLOOD TYPING SEROLOGIC ABO: CPT

## 2021-02-06 PROCEDURE — 81001 URINALYSIS AUTO W/SCOPE: CPT

## 2021-02-06 PROCEDURE — 85025 COMPLETE CBC W/AUTO DIFF WBC: CPT

## 2021-02-06 PROCEDURE — 99285 EMERGENCY DEPT VISIT HI MDM: CPT

## 2021-02-06 RX ORDER — 0.9 % SODIUM CHLORIDE 0.9 %
1000 INTRAVENOUS SOLUTION INTRAVENOUS ONCE
Status: DISCONTINUED | OUTPATIENT
Start: 2021-02-06 | End: 2021-02-07 | Stop reason: HOSPADM

## 2021-02-06 NOTE — LETTER
[facility] Emergency Department  [address] [address]  Phone: [phone]  Fax: [phone]             February 6, 2021    Patient: Robert Roth   YOB: 2000   Date of Visit: 2/6/2021       To Whom It May Concern:    Alexandre Adams was seen and treated in our emergency department on 2/6/2021. She may return to work on 2/7/2021.       Sincerely,             Signature:__________________________________

## 2021-02-07 NOTE — ED NOTES
Concha León, 24year old female present to the ED with N/V and positive pregnancy test, patient states that she is concerned for pregnancy. Patient is currently experiencing abd pain and head pain. Elisabet Garcia  02/06/21 1921

## 2021-02-07 NOTE — PLAN OF CARE
Brought patient back to due [address] [facility] [address], when patient asked why, she was told she had a positive pregnancy test. I called [facility] to confirm she still wanted the test and was told to cancel it. Explained to the patient that the levels were too low to do an US and took her back to her room. Explained situation to her nurse [facility].

## 2021-02-07 NOTE — ED NOTES
Pt reports having vaginal bleeding when she woke up this morning that stopped and also reports having a period two weeks ago, pt reports having a positive pregnancy test two days ago     Greta Centeno RN  02/06/21 1948

## 2021-02-07 NOTE — ED PROVIDER NOTES
eMERGENCY dEPARTMENT eNCOUnter      [facility]    Chief Complaint   Patient presents with    Abdominal Pain    Nausea       HPI    Coretta Macdonald is a 24 y.o. female who presents with abdominal pain and suspected pregnancy. Patient states she took a home pregnancy test 3 days ago and it was positive. LMP was 2 weeks ago. Pain has been constant today. Generalized, cramping. She reports some nausea and vomiting. She had some spotting this morning which has resolved. Denies any fever, chills. Denies urinary symptoms. This is first pregnancy. REVIEW OF SYSTEMS    See HPI for further details. Review of systems otherwise negative. Constitutional:  Denies fever. HENT:  Denies headache. Respiratory:  Denies any shortness of breath. Cardiovascular:  Denies chest pain. GI:  + abdominal pain, + nausea, + vomiting, denies diarrhea, denies constipation. :  + spotting, denies urinary symptoms. Musculoskeletal:  Denies any extremity pain. Back:  Denies back pain. Integument:  Denies any skin changes. Lymphatic:  Denies lymphadenopathy. PAST MEDICAL HISTORY    Past Medical History:   Diagnosis Date    Asthma     Depression     Seasonal allergies        SURGICAL HISTORY    History reviewed. No pertinent surgical history. CURRENT MEDICATIONS    Current Outpatient Rx   Medication Sig Dispense Refill    ibuprofen (ADVIL;MOTRIN) 600 MG tablet Take 1 tablet by mouth every 6 hours as needed for Pain 30 tablet 0    methocarbamol (ROBAXIN) 500 MG tablet Take 1 tablet by mouth 4 times daily As needed for muscle spasm. 20 tablet 0    albuterol sulfate HFA (PROVENTIL HFA) 108 (90 Base) MCG/ACT inhaler Inhale 2 puffs into the lungs every 4 hours as needed for Wheezing or Shortness of Breath With spacer (and mask if indicated). Thanks.  1 Inhaler 1    naproxen (NAPROSYN) 500 MG tablet Take 1 tablet by mouth 2 times daily 60 tablet 0  acetaminophen (TYLENOL) 325 MG tablet Take 2 tablets by mouth every 6 hours as needed for Pain 120 tablet 3    Benzocaine-Menthol (CHLORASEPTIC MAX SORE THROAT) 15-10 MG LOZG Take 1 lozenge by mouth every 2 hours as needed (sore throat) 15 lozenge 0       ALLERGIES    No Known Allergies    FAMILY HISTORY    History reviewed. No pertinent family history. SOCIAL HISTORY    Social History     Socioeconomic History    Marital status: Single     Spouse name: None    Number of children: None    Years of education: None    Highest education level: None   Occupational History    None   Social Needs    Financial resource strain: None    Food insecurity     Worry: None     Inability: None    Transportation needs     Medical: None     Non-medical: None   Tobacco Use    Smoking status: Current Every Day Smoker     Types: Cigarettes, E-Cigarettes    Smokeless tobacco: Never Used   Substance and Sexual Activity    Alcohol use: Yes     Comment: rare    Drug use: Not Currently     Types: Marijuana    Sexual activity: Never   Lifestyle    Physical activity     Days per week: None     Minutes per session: None    Stress: None   Relationships    Social connections     Talks on phone: None     Gets together: None     Attends Orthodoxy service: None     Active member of club or organization: None     Attends meetings of clubs or organizations: None     Relationship status: None    Intimate partner violence     Fear of current or ex partner: None     Emotionally abused: None     Physically abused: None     Forced sexual activity: None   Other Topics Concern    None   Social History Narrative    None       PHYSICAL EXAM    VITAL SIGNS: BP (!) 170/102   Pulse 102   Temp 98.3 °F (36.8 °C) (Oral)   Resp 19   Ht 5' 9\" (1.753 m)   Wt 230 lb (104.3 kg)   SpO2 99%   BMI 33.97 kg/m²   Constitutional:  Well developed, well nourished, no acute distress, non-toxic appearance   Eyes:  Sclera anicteric. HENT:  NC/AT. Ears, nose normal.  Oropharynx moist.  Neck:  Supple. Respiratory:  Lungs CTAB. Cardiovascular:  RRR. GI:  Abdomen soft, non-distended, non-tender. BS active. :  No CVA tenderness. Musculoskeletal:  No acute deformities. Integument:  Warm and dry. Neurologic:  Alert & oriented. No focal deficits. LABS/IMAGING    Labs Reviewed   CBC WITH AUTO DIFFERENTIAL - Abnormal; Notable for the following components:       Result Value    Hemoglobin 12.4 (*)     MCH 26.4 (*)     MCHC 30.1 (*)     Segs Relative 72.4 (*)     Lymphocytes % 19.5 (*)     Monocytes % 4.7 (*)     All other components within normal limits   COMPREHENSIVE METABOLIC PANEL   URINALYSIS   LIPASE   HCG, QUANTITATIVE, PREGNANCY       Xr Chest Portable    Result Date: 1/15/2021  EXAMINATION: ONE XRAY VIEW OF THE CHEST 1/15/2021 4:26 pm COMPARISON: 03/27/2020 HISTORY: Reason for Exam: Chest pain, anxiety state Acuity: Acute Type of Exam: Initial FINDINGS: The lungs are without acute focal process. No effusion or pneumothorax. The cardiomediastinal silhouette is normal.  The osseous structures are intact without acute process. Negative chest.     ED COURSE & MEDICAL DECISION MAKING    Pertinent Labs & Imaging studies reviewed. (See chart for details)   -  Patient seen and evaluated in the emergency department. -  Triage and nursing notes reviewed and incorporated. -  Old chart records reviewed and incorporated. -  Supervising physician was Dr. Justyn Pedro. Patient was seen independently. -  Differential diagnosis includes:  appendicitis, gastritis, bowel obstruction, cholecystitis/cholelithiasis, diverticulitis, incarcerated hernia, pancreatitis, performed bowel, uti, and others   -  Work-up included:  see above  -  ED treatment included:  Patient refused any treatment. -  Results discussed with patient. Her serum hcg quant is negative. Na is 128. Remainder of labs unremarkable. She declined CT scan of the abdomen after finding out negative pregnancy result, just wants discharged and a note for work. Advised on FU with PCP or return if continued/worsening abdominal pain. She is agreeable with plan of care and disposition.  -  Disposition:  Home    In light of current events, I did utilize appropriate PPE (including N95 and surgical face mask, safety glasses, and gloves, as recommended by the health facility/national standard best practice, during my bedside interactions with the patient. FINAL IMPRESSION    1. Negative pregnancy test    2.  Hyponatremia                George Harry PA-C  02/06/21 1702

## 2021-05-02 ENCOUNTER — HOSPITAL ENCOUNTER (EMERGENCY)
Age: 21
Discharge: HOME | End: 2021-05-02
Payer: MEDICARE

## 2021-05-02 VITALS
RESPIRATION RATE: 18 BRPM | DIASTOLIC BLOOD PRESSURE: 90 MMHG | TEMPERATURE: 97.7 F | HEIGHT: 68 IN | BODY MASS INDEX: 36.37 KG/M2 | WEIGHT: 240 LBS | OXYGEN SATURATION: 98 % | SYSTOLIC BLOOD PRESSURE: 113 MMHG | HEART RATE: 79 BPM

## 2021-05-02 DIAGNOSIS — F41.1 ANXIETY STATE: Primary | ICD-10-CM

## 2021-05-02 PROCEDURE — 99285 EMERGENCY DEPT VISIT HI MDM: CPT

## 2021-05-02 PROCEDURE — 6370000000 HC RX 637 (ALT 250 FOR IP): Performed by: PHYSICIAN ASSISTANT

## 2021-05-02 RX ORDER — ARIPIPRAZOLE 15 MG/1
15 TABLET ORAL DAILY
COMMUNITY

## 2021-05-02 RX ORDER — ACETAMINOPHEN 500 MG
1000 TABLET ORAL ONCE
Status: COMPLETED | OUTPATIENT
Start: 2021-05-02 | End: 2021-05-02

## 2021-05-02 RX ORDER — LORAZEPAM 1 MG/1
1 TABLET ORAL ONCE
Status: COMPLETED | OUTPATIENT
Start: 2021-05-02 | End: 2021-05-02

## 2021-05-02 RX ADMIN — LORAZEPAM 1 MG: 1 TABLET ORAL at 19:35

## 2021-05-02 RX ADMIN — ACETAMINOPHEN 1000 MG: 500 TABLET ORAL at 19:35

## 2021-05-02 ASSESSMENT — PAIN SCALES - GENERAL: PAINLEVEL_OUTOF10: 6

## 2021-05-02 NOTE — ED TRIAGE NOTES
Pt to ED today for c/o feeling anxiety, onset approx. 45 min ago. Pt states her \"boss is a bitch\" and states she is a very stressful person. Pt also c/o headache. Pt denies any suicidal thoughts.  Pt tearful

## 2021-05-02 NOTE — ED PROVIDER NOTES
use: Not Currently     Comment: rare    Drug use: Not Currently     Types: Marijuana    Sexual activity: Never   Lifestyle    Physical activity     Days per week: None     Minutes per session: None    Stress: None   Relationships    Social connections     Talks on phone: None     Gets together: None     Attends Yarsanism service: None     Active member of club or organization: None     Attends meetings of clubs or organizations: None     Relationship status: None    Intimate partner violence     Fear of current or ex partner: None     Emotionally abused: None     Physically abused: None     Forced sexual activity: None   Other Topics Concern    None   Social History Narrative    None       Medications/Allergies     Previous Medications    ARIPIPRAZOLE (ABILIFY) 15 MG TABLET    Take 15 mg by mouth daily    METHOCARBAMOL (ROBAXIN) 500 MG TABLET    Take 1 tablet by mouth 4 times daily As needed for muscle spasm. No Known Allergies     Physical Exam       ED Triage Vitals [05/02/21 1910]   BP Temp Temp Source Pulse Resp SpO2 Height Weight   (!) 159/87 97.7 °F (36.5 °C) Oral 96 18 100 % 5' 8\" (1.727 m) 240 lb (108.9 kg)     GENERAL APPEARANCE:  Well-developed, well-nourished, no acute distress. HEAD:  NC/AT. EYES:  Sclera anicteric. ENT:  Ears, nose, mouth normal.     NECK:  Supple. CARDIO:  RRR. LUNGS:   CTAB. Respirations unlabored. EXTREMITIES:  No acute deformities. SKIN:  Warm and dry. NEUROLOGICAL:  Alert and oriented. PSYCHIATRIC:  Tearful. ED Course and MDM   -  Patient seen and evaluated in the emergency department. -  Triage and nursing notes reviewed and incorporated. -  Old chart records reviewed and incorporated. -  Supervising physician was Dr. Yoselin Sanchez. Patient was seen independently. -  ED medications:  Ativan, Tylenol  -  Patient feels better on re-examination after Ativan and Tylenol. She appears well. Will dc home, FU with PCP/Psychiatry. Return as needed.   She is agreeable with plan of care and disposition.  -  Disposition:  Home    In light of current events, I did utilize appropriate PPE (including N95 and surgical face mask, safety glasses, and gloves, as recommended by the health facility/national standard best practice, during my bedside interactions with the patient. Final Impression      1.  Anxiety state            Josh [address], [address] [place], [place]  05/02/21 1842

## 2021-06-12 ENCOUNTER — APPOINTMENT (OUTPATIENT)
Dept: GENERAL RADIOLOGY | Age: 21
End: 2021-06-12
Payer: MEDICARE

## 2021-06-12 ENCOUNTER — HOSPITAL ENCOUNTER (EMERGENCY)
Age: 21
Discharge: HOME | End: 2021-06-12
Payer: MEDICARE

## 2021-06-12 VITALS
WEIGHT: 293 LBS | SYSTOLIC BLOOD PRESSURE: 144 MMHG | HEART RATE: 90 BPM | HEIGHT: 70 IN | OXYGEN SATURATION: 97 % | DIASTOLIC BLOOD PRESSURE: 97 MMHG | RESPIRATION RATE: 19 BRPM | BODY MASS INDEX: 41.95 KG/M2 | TEMPERATURE: 98 F

## 2021-06-12 DIAGNOSIS — M25.572 ACUTE LEFT ANKLE PAIN: Primary | ICD-10-CM

## 2021-06-12 PROCEDURE — 73610 X-RAY EXAM OF ANKLE: CPT

## 2021-06-12 PROCEDURE — 6370000000 HC RX 637 (ALT 250 FOR IP): Performed by: PHYSICIAN ASSISTANT

## 2021-06-12 PROCEDURE — 99283 EMERGENCY DEPT VISIT LOW MDM: CPT

## 2021-06-12 RX ORDER — NAPROXEN 500 MG/1
500 TABLET ORAL 2 TIMES DAILY
Qty: 15 TABLET | Refills: 0 | Status: SHIPPED | OUTPATIENT
Start: 2021-06-12 | End: 2021-06-12 | Stop reason: SDUPTHER

## 2021-06-12 RX ORDER — NAPROXEN 500 MG/1
500 TABLET ORAL 2 TIMES DAILY
Qty: 15 TABLET | Refills: 0 | Status: SHIPPED | OUTPATIENT
Start: 2021-06-12 | End: 2022-05-12

## 2021-06-12 RX ORDER — NAPROXEN 250 MG/1
500 TABLET ORAL ONCE
Status: COMPLETED | OUTPATIENT
Start: 2021-06-12 | End: 2021-06-12

## 2021-06-12 RX ADMIN — NAPROXEN 500 MG: 250 TABLET ORAL at 16:28

## 2021-06-12 ASSESSMENT — PAIN DESCRIPTION - PAIN TYPE: TYPE: ACUTE PAIN

## 2021-06-12 ASSESSMENT — PAIN DESCRIPTION - LOCATION: LOCATION: ANKLE

## 2021-06-12 ASSESSMENT — PAIN SCALES - GENERAL
PAINLEVEL_OUTOF10: 8
PAINLEVEL_OUTOF10: 8

## 2021-06-12 ASSESSMENT — PAIN DESCRIPTION - ORIENTATION: ORIENTATION: LEFT

## 2021-06-12 ASSESSMENT — PAIN DESCRIPTION - DESCRIPTORS: DESCRIPTORS: THROBBING

## 2021-06-12 NOTE — ED PROVIDER NOTES
EMERGENCY DEPARTMENT ENCOUNTER        PCP: No primary care provider on file. CHIEF COMPLAINT    Chief Complaint   Patient presents with    Ankle Pain     rolled left ankle walking on concrete       This patient was not evaluated by the attending physician. I have independently evaluated this patient. HPI    Audrey Coleman is a 24 y.o. female who presents with pain localized in the Left ankle. Onset was last night. The context is patient states she was walking when she excellently stepped in pothole rolling and injuring left ankle. The pain severity is moderate. The patient has no associated other injuries. The pain is aggravated by ambulation and direct palpation. Patient denies pregnancy. REVIEW OF SYSTEMS    General: No fever or chills  Skin: No lacerations or puncture wounds  Musculoskeletal: Complains of ankle pain, no other joint pain    All other review of systems are negative  See HPI and nursing notes for additional information     PAST MEDICAL & SURGICAL HISTORY    Past Medical History:   Diagnosis Date    Asthma     Depression     Seasonal allergies      No past surgical history on file. CURRENT MEDICATIONS    Current Outpatient Rx   Medication Sig Dispense Refill    naproxen (NAPROSYN) 500 MG tablet Take 1 tablet by mouth 2 times daily 15 tablet 0    ARIPiprazole (ABILIFY) 15 MG tablet Take 15 mg by mouth daily      methocarbamol (ROBAXIN) 500 MG tablet Take 1 tablet by mouth 4 times daily As needed for muscle spasm.  20 tablet 0       ALLERGIES    No Known Allergies    SOCIAL & FAMILY HISTORY    Social History     Socioeconomic History    Marital status: Single     Spouse name: Not on file    Number of children: Not on file    Years of education: Not on file    Highest education level: Not on file   Occupational History    Not on file   Tobacco Use    Smoking status: Current Every Day Smoker     Types: Cigarettes, E-Cigarettes    Smokeless tobacco: Never Used   Substance and Sexual Activity    Alcohol use: Not Currently     Comment: rare    Drug use: Not Currently     Types: Marijuana    Sexual activity: Never   Other Topics Concern    Not on file   Social History Narrative    Not on file     Social Determinants of Health     Financial Resource Strain:     Difficulty of Paying Living Expenses:    Food Insecurity:     Worried About Running Out of Food in the Last Year:     [address] in the Last Year:    Transportation Needs:     Lack of Transportation (Medical):  Lack of Transportation (Non-Medical):    Physical Activity:     Days of Exercise per Week:     Minutes of Exercise per Session:    Stress:     Feeling of Stress :    Social Connections:     Frequency of Communication with Friends and Family:     Frequency of Social Gatherings with Friends and Family:     Attends Mu-ism Services:     Active Member of Clubs or Organizations:     Attends Club or Organization Meetings:     Marital Status:    Intimate Partner Violence:     Fear of Current or Ex-Partner:     Emotionally Abused:     Physically Abused:     Sexually Abused:      No family history on file. PHYSICAL EXAM    VITAL SIGNS: BP (!) 144/97   Pulse 90   Temp 98 °F (36.7 °C)   Resp 19   Ht 5' 10\" (1.778 m)   Wt (!) 390 lb (176.9 kg)   SpO2 97%   BMI 55.96 kg/m²   Constitutional:  Well developed, appears comfortable  HENT:  Atraumatic  Respiratory:  Nonlabored breathing  Musculoskeletal: The Left  ankle demonstrates generalized soft tissue swelling, greatest over lateral aspect. There is tenderness to palpation in this region. Range of motion difficult to assess due to pain - no obvious deficits. The ankle joint is stable. Achilles tendon clinically intact. No swelling, discoloration, or tenderness to palpation of the proximal to distal lower leg bones,  foot bones/toes    Vascular:  DP pulse 2+, capillary refill intact. Integument:  No open wounds.   Neurologic:  Awake alert, no slurred speech     RADIOLOGY   XR ANKLE LEFT (MIN 3 VIEWS)   Final Result   No acute osseous abnormality of the left ankle. ED COURSE & MEDICAL DECISION MAKING      Patient presents as above. Patient provided naproxen for pain. Left ankle x-ray shows no acute osseous abnormality. I discussed imaging results with patient day. I suspect ankle sprain. Patient provided Ace wrap and Aircast.  Patient declines crutches. Recommend rest, ice, compression elevation. Recommend follow-up with orthopedist in 1 week if no improvement in pain. Patient provide prescription for naproxen for pain. Clinical  IMPRESSION    1. Acute left ankle pain          Diagnosis and plan discussed in detail with patient who understands and agrees. Patient agrees to return emergency department if symptoms worsen or any new symptoms develop. Comment: Please note this report has been produced using speech recognition software and may contain errors related to that system including errors in grammar, punctuation, and spelling, as well as words and phrases that may be inappropriate. If there are any questions or concerns please feel free to contact the dictating provider for clarification.         Willena Gitelman, PA-C  06/12/21 0650

## 2021-06-12 NOTE — LETTER
[address] [facility] Emergency Department  [address] [id number]  Phone: [phone]  Fax: [phone]               June 12, 2021    Patient: Onesimo Owens   YOB: 2000   Date of Visit: 6/12/2021       To Whom It May Concern:    Shakir Jo was seen and treated in our emergency department on 6/12/2021. She may return to work on 6/14/2021.       Sincerely,       Shelbie Marie RN         Signature:__________________________________

## 2021-08-25 ENCOUNTER — HOSPITAL ENCOUNTER (EMERGENCY)
Age: 21
Discharge: HOME | End: 2021-08-25
Payer: MEDICARE

## 2021-08-25 ENCOUNTER — APPOINTMENT (OUTPATIENT)
Dept: CT IMAGING | Age: 21
End: 2021-08-25
Payer: MEDICARE

## 2021-08-25 VITALS
BODY MASS INDEX: 44.41 KG/M2 | SYSTOLIC BLOOD PRESSURE: 186 MMHG | HEART RATE: 98 BPM | RESPIRATION RATE: 17 BRPM | OXYGEN SATURATION: 100 % | TEMPERATURE: 98.4 F | WEIGHT: 293 LBS | HEIGHT: 68 IN | DIASTOLIC BLOOD PRESSURE: 86 MMHG

## 2021-08-25 DIAGNOSIS — R10.12 ABDOMINAL PAIN, LEFT UPPER QUADRANT: Primary | ICD-10-CM

## 2021-08-25 LAB
ALBUMIN SERPL-MCNC: 4 GM/DL (ref 3.4–5)
ALP BLD-CCNC: 127 IU/L (ref 40–128)
ALT SERPL-CCNC: 27 U/L (ref 10–40)
ANION GAP SERPL CALCULATED.3IONS-SCNC: 8 MMOL/L (ref 4–16)
AST SERPL-CCNC: 21 IU/L (ref 15–37)
BACTERIA: NEGATIVE /HPF
BASOPHILS ABSOLUTE: 0 K/CU MM
BASOPHILS RELATIVE PERCENT: 0.4 % (ref 0–1)
BILIRUB SERPL-MCNC: 0.2 MG/DL (ref 0–1)
BILIRUBIN URINE: NEGATIVE MG/DL
BLOOD, URINE: ABNORMAL
BUN BLDV-MCNC: 8 MG/DL (ref 6–23)
CALCIUM SERPL-MCNC: 9.8 MG/DL (ref 8.3–10.6)
CHLORIDE BLD-SCNC: 100 MMOL/L (ref 99–110)
CLARITY: CLEAR
CO2: 29 MMOL/L (ref 21–32)
COLOR: ABNORMAL
CREAT SERPL-MCNC: 0.6 MG/DL (ref 0.6–1.1)
DIFFERENTIAL TYPE: ABNORMAL
EOSINOPHILS ABSOLUTE: 0.4 K/CU MM
EOSINOPHILS RELATIVE PERCENT: 5.5 % (ref 0–3)
GFR AFRICAN AMERICAN: >60 ML/MIN/1.73M2
GFR NON-AFRICAN AMERICAN: >60 ML/MIN/1.73M2
GLUCOSE BLD-MCNC: 87 MG/DL (ref 70–99)
GLUCOSE, URINE: NEGATIVE MG/DL
HCG QUALITATIVE: NEGATIVE
HCT VFR BLD CALC: 42.4 % (ref 37–47)
HEMOGLOBIN: 12.7 GM/DL (ref 12.5–16)
IMMATURE NEUTROPHIL %: 0.2 % (ref 0–0.43)
KETONES, URINE: NEGATIVE MG/DL
LEUKOCYTE ESTERASE, URINE: NEGATIVE
LIPASE: 27 IU/L (ref 13–60)
LYMPHOCYTES ABSOLUTE: 1.8 K/CU MM
LYMPHOCYTES RELATIVE PERCENT: 22.2 % (ref 24–44)
MCH RBC QN AUTO: 26.7 PG (ref 27–31)
MCHC RBC AUTO-ENTMCNC: 30 % (ref 32–36)
MCV RBC AUTO: 89.1 FL (ref 78–100)
MONOCYTES ABSOLUTE: 0.6 K/CU MM
MONOCYTES RELATIVE PERCENT: 7.2 % (ref 0–4)
NITRITE URINE, QUANTITATIVE: NEGATIVE
NUCLEATED RBC %: 0 %
PDW BLD-RTO: 13.4 % (ref 11.7–14.9)
PH, URINE: 8 (ref 5–8)
PLATELET # BLD: 232 K/CU MM (ref 140–440)
PMV BLD AUTO: 9.9 FL (ref 7.5–11.1)
POTASSIUM SERPL-SCNC: 4.4 MMOL/L (ref 3.5–5.1)
PROTEIN UA: NEGATIVE MG/DL
RBC # BLD: 4.76 M/CU MM (ref 4.2–5.4)
RBC URINE: <1 /HPF (ref 0–6)
SEGMENTED NEUTROPHILS ABSOLUTE COUNT: 5.2 K/CU MM
SEGMENTED NEUTROPHILS RELATIVE PERCENT: 64.5 % (ref 36–66)
SODIUM BLD-SCNC: 137 MMOL/L (ref 135–145)
SPECIFIC GRAVITY UA: 1 (ref 1–1.03)
TOTAL IMMATURE NEUTOROPHIL: 0.02 K/CU MM
TOTAL NUCLEATED RBC: 0 K/CU MM
TOTAL PROTEIN: 7.5 GM/DL (ref 6.4–8.2)
TRICHOMONAS: ABNORMAL /HPF
UROBILINOGEN, URINE: NEGATIVE MG/DL (ref 0.2–1)
WBC # BLD: 8.1 K/CU MM (ref 4–10.5)
WBC UA: <1 /HPF (ref 0–5)

## 2021-08-25 PROCEDURE — 85025 COMPLETE CBC W/AUTO DIFF WBC: CPT

## 2021-08-25 PROCEDURE — 80053 COMPREHEN METABOLIC PANEL: CPT

## 2021-08-25 PROCEDURE — 81001 URINALYSIS AUTO W/SCOPE: CPT

## 2021-08-25 PROCEDURE — 74176 CT ABD & PELVIS W/O CONTRAST: CPT

## 2021-08-25 PROCEDURE — 99284 EMERGENCY DEPT VISIT MOD MDM: CPT

## 2021-08-25 PROCEDURE — 83690 ASSAY OF LIPASE: CPT

## 2021-08-25 PROCEDURE — 84703 CHORIONIC GONADOTROPIN ASSAY: CPT

## 2021-08-25 RX ORDER — DICYCLOMINE HYDROCHLORIDE 10 MG/1
10 CAPSULE ORAL
Qty: 20 CAPSULE | Refills: 0 | Status: SHIPPED | OUTPATIENT
Start: 2021-08-25 | End: 2022-09-10

## 2021-08-25 RX ORDER — FAMOTIDINE 20 MG/1
20 TABLET, FILM COATED ORAL 2 TIMES DAILY
Qty: 30 TABLET | Refills: 0 | Status: SHIPPED | OUTPATIENT
Start: 2021-08-25 | End: 2022-09-20

## 2021-08-25 RX ORDER — DICYCLOMINE HYDROCHLORIDE 10 MG/ML
20 INJECTION INTRAMUSCULAR ONCE
Status: DISCONTINUED | OUTPATIENT
Start: 2021-08-25 | End: 2021-08-25 | Stop reason: HOSPADM

## 2021-08-25 RX ORDER — KETOROLAC TROMETHAMINE 30 MG/ML
30 INJECTION, SOLUTION INTRAMUSCULAR; INTRAVENOUS ONCE
Status: DISCONTINUED | OUTPATIENT
Start: 2021-08-25 | End: 2021-08-25 | Stop reason: HOSPADM

## 2021-08-25 ASSESSMENT — PAIN SCALES - GENERAL: PAINLEVEL_OUTOF10: 8

## 2021-08-25 ASSESSMENT — PAIN DESCRIPTION - PAIN TYPE: TYPE: ACUTE PAIN

## 2021-08-25 ASSESSMENT — PAIN DESCRIPTION - DESCRIPTORS: DESCRIPTORS: DULL

## 2021-08-25 ASSESSMENT — PAIN DESCRIPTION - LOCATION: LOCATION: ABDOMEN

## 2021-08-25 NOTE — ED PROVIDER NOTES
eMERGENCY dEPARTMENT eNCOUnter         [address] [facility]     PCP: No primary care provider on file. CHIEF COMPLAINT    Chief Complaint   Patient presents with    Abdominal Pain       HPI    Heather Franz is a 24 y.o. female who presents with abdominal pain. Onset is prior travel, x1 week ago. Context is patient states that he is she has been having intermittent dull cramping pain in the left upper abdomen, 8/10, worse with standing. No changes in appetite, no new foods or medications. Denying any associated nausea vomiting or diarrhea. No fever or chills. Patient denies any concern for pregnancy, currently on her menstrual cycle. No history of kidney stones. Denying any dysuria hematuria. No concern for STD. States that she went to urgent care yesterday for these complaints but states \"they told me there is nothing I can do and I have to go to the ER. \"  No previous abdominal surgical history. Denying any pleuritic exertional chest pain, shortness of breath or cough. REVIEW OF SYSTEMS    Constitutional:  Denies fever, chills, weight loss or weakness   HENT:  Denies sore throat or ear pain   Cardiovascular:  Denies chest pain, palpitations or swelling   Respiratory:  Denies cough or shortness of breath   GI:  See HPI above  : No hematuria or dysuria. No vaginal symptoms. Musculoskeletal:  Denies back pain or groin pain or masses. No pain or swelling of extremities. Skin:  Denies rash  Neurologic:  Denies headache, focal weakness or sensory changes   Endocrine:  Denies polyuria or polydypsia   Lymphatic:  Denies swollen glands     All other review of systems are negative  See HPI and nursing notes for additional information     PAST MEDICAL & SURGICAL HISTORY    Past Medical History:   Diagnosis Date    Asthma     Depression     Seasonal allergies      No past surgical history on file.     CURRENT MEDICATIONS    Current Outpatient Rx Medication Sig Dispense Refill    dicyclomine (BENTYL) 10 MG capsule Take 1 capsule by mouth 4 times daily (before meals and nightly) 20 capsule 0    famotidine (PEPCID) 20 MG tablet Take 1 tablet by mouth 2 times daily 30 tablet 0    naproxen (NAPROSYN) 500 MG tablet Take 1 tablet by mouth 2 times daily 15 tablet 0    ARIPiprazole (ABILIFY) 15 MG tablet Take 15 mg by mouth daily      methocarbamol (ROBAXIN) 500 MG tablet Take 1 tablet by mouth 4 times daily As needed for muscle spasm. 20 tablet 0       ALLERGIES    No Known Allergies    SOCIAL AND FAMILY HISTORY    Social History     Socioeconomic History    Marital status: Single     Spouse name: Not on file    Number of children: Not on file    Years of education: Not on file    Highest education level: Not on file   Occupational History    Not on file   Tobacco Use    Smoking status: Current Every Day Smoker     Types: Cigarettes, E-Cigarettes    Smokeless tobacco: Never Used   Substance and Sexual Activity    Alcohol use: Not Currently     Comment: rare    Drug use: Not Currently     Types: Marijuana    Sexual activity: Never   Other Topics Concern    Not on file   Social History Narrative    Not on file     Social Determinants of Health     Financial Resource Strain:     Difficulty of Paying Living Expenses:    Food Insecurity:     Worried About Running Out of Food in the Last Year:     Ran Out of Food in the Last Year:    Transportation Needs:     Lack of Transportation (Medical):      Lack of Transportation (Non-Medical):    Physical Activity:     Days of Exercise per Week:     Minutes of Exercise per Session:    Stress:     Feeling of Stress :    Social Connections:     Frequency of Communication with Friends and Family:     Frequency of Social Gatherings with Friends and Family:     Attends Mormonism Services:     Active Member of Clubs or Organizations:     Attends Club or Organization Meetings:     Marital Status: Intimate Partner Violence:     Fear of Current or Ex-Partner:     Emotionally Abused:     Physically Abused:     Sexually Abused:      No family history on file. PHYSICAL EXAM    VITAL SIGNS: BP (!) 186/86   Pulse 98   Temp 98.4 °F (36.9 °C) (Oral)   Resp 17   Ht 5' 8\" (1.727 m)   Wt 300 lb (136.1 kg)   SpO2 100%   BMI 45.61 kg/m²   General:  Well developed, elevated BMI, In no acute distress  Eyes:  Sclera nonicteric, Conjunctiva moist, No discharge  Head:  Normocephalic, Atramautic  Neck/Lymphatics: Supple, no JVD, no swollen nodes  Respiratory:  Clear to ausculation bilaterally, No retractions, Non labored breathing  Cardiovascular:  RRR, No murmurs/gallops/thrills  GI:   No gross discoloration. Bowel sounds present in all quadrants, No audible bruits. Soft,  Nondistended. Mild tenderness in the left upper quadrant deep palpation  without rebound tenderness or guarding, No palpable pulsatile masses or obvious hernias. No hepatosplenomegaly. Back:  No CVA tenderness to percussion.   Musculoskeletal:  No edema, No deformity  Peripheral Vascular: Distal pulses 2+ equal bilaterally  Integument: No rash, Normal turgor  Neurologic:  Alert & oriented, Normal speech  Psychiatric: Cooperative, pleasant affect       I have reviewed and interpreted all of the currently available lab results from this visit (if applicable):  Results for orders placed or performed during the hospital encounter of 08/25/21   CBC auto diff   Result Value Ref Range    WBC 8.1 4.0 - 10.5 K/CU MM    RBC 4.76 4.2 - 5.4 M/CU MM    Hemoglobin 12.7 12.5 - 16.0 GM/DL    Hematocrit 42.4 37 - 47 %    MCV 89.1 78 - 100 FL    MCH 26.7 (L) 27 - 31 PG    MCHC 30.0 (L) 32.0 - 36.0 %    RDW 13.4 11.7 - 14.9 %    Platelets 027 309 - 090 K/CU MM    MPV 9.9 7.5 - 11.1 FL    Differential Type AUTOMATED DIFFERENTIAL     Segs Relative 64.5 36 - 66 %    Lymphocytes % 22.2 (L) 24 - 44 %    Monocytes % 7.2 (H) 0 - 4 %    Eosinophils % 5.5 (H) 0 - 3 % Basophils % 0.4 0 - 1 %    Segs Absolute 5.2 K/CU MM    Lymphocytes Absolute 1.8 K/CU MM    Monocytes Absolute 0.6 K/CU MM    Eosinophils Absolute 0.4 K/CU MM    Basophils Absolute 0.0 K/CU MM    Nucleated RBC % 0.0 %    Total Nucleated RBC 0.0 K/CU MM    Total Immature Neutrophil 0.02 K/CU MM    Immature Neutrophil % 0.2 0 - 0.43 %   CMP   Result Value Ref Range    Sodium 137 135 - 145 MMOL/L    Potassium 4.4 3.5 - 5.1 MMOL/L    Chloride 100 99 - 110 mMol/L    CO2 29 21 - 32 MMOL/L    BUN 8 6 - 23 MG/DL    CREATININE 0.6 0.6 - 1.1 MG/DL    Glucose 87 70 - 99 MG/DL    Calcium 9.8 8.3 - 10.6 MG/DL    Albumin 4.0 3.4 - 5.0 GM/DL    Total Protein 7.5 6.4 - 8.2 GM/DL    Total Bilirubin 0.2 0.0 - 1.0 MG/DL    ALT 27 10 - 40 U/L    AST 21 15 - 37 IU/L    Alkaline Phosphatase 127 40 - 128 IU/L    GFR Non-African American >60 >60 mL/min/1.73m2    GFR African American >60 >60 mL/min/1.73m2    Anion Gap 8 4 - 16   Lipase   Result Value Ref Range    Lipase 27 13 - 60 IU/L   Urinalysis   Result Value Ref Range    Color, UA STRAW (A) YELLOW    Clarity, UA CLEAR CLEAR    Glucose, Urine NEGATIVE NEGATIVE MG/DL    Bilirubin Urine NEGATIVE NEGATIVE MG/DL    Ketones, Urine NEGATIVE NEGATIVE MG/DL    Specific Gravity, UA 1.004 1.001 - 1.035    Blood, Urine SMALL (A) NEGATIVE    pH, Urine 8.0 5.0 - 8.0    Protein, UA NEGATIVE NEGATIVE MG/DL    Urobilinogen, Urine NEGATIVE 0.2 - 1.0 MG/DL    Nitrite Urine, Quantitative NEGATIVE NEGATIVE    Leukocyte Esterase, Urine NEGATIVE NEGATIVE    RBC, UA <1 0 - 6 /HPF    WBC, UA <1 0 - 5 /HPF    Bacteria, UA NEGATIVE NEGATIVE /HPF    Trichomonas, UA NONE SEEN NONE SEEN /HPF   HCG Serum, Qualitative   Result Value Ref Range    hCG Qual NEGATIVE         RADIOLOGY/PROCEDURES       CT ABDOMEN PELVIS WO CONTRAST Additional Contrast? None (Final result)  Result time 08/25/21 19:07:19  Final result by Sara Varma MD (08/25/21 19:07:19)                Impression:    No acute abdominopelvic abnormality. Narrative:    EXAMINATION:   CT OF THE ABDOMEN AND PELVIS WITHOUT CONTRAST 8/25/2021 6:21 pm     TECHNIQUE:   CT of the abdomen and pelvis was performed without the administration of   intravenous contrast. Multiplanar reformatted images are provided for review. Dose modulation, iterative reconstruction, and/or weight based adjustment of   the mA/kV was utilized to reduce the radiation dose to as low as reasonably   achievable. COMPARISON:   None. HISTORY:   ORDERING SYSTEM PROVIDED HISTORY: LUQ abdominal pain x1 week   TECHNOLOGIST PROVIDED HISTORY:   Reason for exam:->LUQ abdominal pain x1 week   Additional Contrast?->None   Decision Support Exception - unselect if not a suspected or confirmed   emergency medical condition->Emergency Medical Condition (MA)   Is the patient pregnant?->No   Reason for Exam: LUQ abdominal pain x1 week   Acuity: Acute   Type of Exam: Initial   Additional signs and symptoms: constipation   Relevant Medical/Surgical History: none     FINDINGS:   Lower Chest:  Visualized portion of the lower chest demonstrates no acute   abnormality. Organs: Limited evaluation due lack of intravenous contrast.  The liver,   gallbladder, biliary system, pancreas, adrenal glands, spleen, and kidneys   are normal.  There is no hydronephrosis or evidence of urinary tract calculus. GI/Bowel: The bowel is normal, including the appendix. Pelvis: The urinary bladder and pelvic organs are unremarkable. Peritoneum/Retroperitoneum: There is no intraperitoneal free air or free   fluid.  There is no pathologically enlarged or morphologically abnormal lymph   node.  The abdominal aorta is normal in caliber. Bones/Soft Tissues: No acute osseous or soft tissue abnormality is seen. ED COURSE & MEDICAL DECISION MAKING      Vital signs and nursing notes reviewed during ED course. I have independently evaluated this patient .   Supervising MD Martha Mart ovarian torsion or tubo-ovarian ovarian abscess thus I consider the discharge disposition reasonable. Patient is discharged stable condition with Bentyl and Pepcid. Educated to avoid spicy acidic or fried foods, encouraged to push clear fluids and encouraged a bland/brat diet with gradual advancement to normal diet as tolerated. Patient does not have PCP so I have given him/her doctor of the day contact information and encourage him/her to establish care and followup in the next 1-2 days. I discussed the unclear etiology of patient's symptoms today and the need for return to emergency department in 8-12 hours for repeat evaluation, sooner if symptoms worsen or any new symptoms develop. Patient agrees to return emergency department if symptoms worsen or any new symptoms develop. Comment: Please note this report has been produced using speech recognition software and may contain errors related to that system including errors in grammar, punctuation, and spelling, as well as words and phrases that may be inappropriate. If there are any questions or concerns please feel free to contact the dictating provider for clarification.           Monica Hernandez PA-C  08/25/21 [id number]

## 2021-11-17 ENCOUNTER — HOSPITAL ENCOUNTER (EMERGENCY)
Age: 21
Discharge: LEFT BEFORE DISCHARGE | End: 2021-11-17

## 2021-11-17 VITALS
HEART RATE: 86 BPM | WEIGHT: 293 LBS | RESPIRATION RATE: 18 BRPM | OXYGEN SATURATION: 100 % | DIASTOLIC BLOOD PRESSURE: 51 MMHG | BODY MASS INDEX: 47.14 KG/M2 | TEMPERATURE: 98.2 F | SYSTOLIC BLOOD PRESSURE: 109 MMHG

## 2021-11-22 ENCOUNTER — HOSPITAL ENCOUNTER (EMERGENCY)
Age: 21
Discharge: HOME | End: 2021-11-22
Attending: STUDENT IN AN ORGANIZED HEALTH CARE EDUCATION/TRAINING PROGRAM
Payer: MEDICARE

## 2021-11-22 VITALS
SYSTOLIC BLOOD PRESSURE: 138 MMHG | RESPIRATION RATE: 20 BRPM | TEMPERATURE: 98.6 F | HEART RATE: 88 BPM | DIASTOLIC BLOOD PRESSURE: 89 MMHG | OXYGEN SATURATION: 99 %

## 2021-11-22 DIAGNOSIS — H10.31 ACUTE CONJUNCTIVITIS OF RIGHT EYE, UNSPECIFIED ACUTE CONJUNCTIVITIS TYPE: Primary | ICD-10-CM

## 2021-11-22 PROCEDURE — 99283 EMERGENCY DEPT VISIT LOW MDM: CPT

## 2021-11-22 PROCEDURE — 6370000000 HC RX 637 (ALT 250 FOR IP): Performed by: STUDENT IN AN ORGANIZED HEALTH CARE EDUCATION/TRAINING PROGRAM

## 2021-11-22 RX ORDER — TETRACAINE HYDROCHLORIDE 5 MG/ML
1 SOLUTION OPHTHALMIC ONCE
Status: COMPLETED | OUTPATIENT
Start: 2021-11-22 | End: 2021-11-22

## 2021-11-22 RX ORDER — KETOTIFEN FUMARATE 0.35 MG/ML
1 SOLUTION/ DROPS OPHTHALMIC 3 TIMES DAILY PRN
Qty: 1 ML | Refills: 0 | Status: SHIPPED | OUTPATIENT
Start: 2021-11-22 | End: 2021-12-02

## 2021-11-22 RX ADMIN — TETRACAINE HYDROCHLORIDE 1 DROP: 5 SOLUTION OPHTHALMIC at 01:28

## 2021-11-22 RX ADMIN — FLUORESCEIN SODIUM 1 MG: 1 STRIP OPHTHALMIC at 01:28

## 2021-11-22 ASSESSMENT — PAIN SCALES - GENERAL: PAINLEVEL_OUTOF10: 8

## 2021-11-22 NOTE — ED PROVIDER NOTES
[facility] COMPLAINT    Chief Complaint   Patient presents with    Eye Pain     right sided    Facial Swelling     right sided; started today when she woke up        [facility]    Lindsay Ortiz is a 24 y.o. female with history significant for asthma, depression, allergies who presents with right-sided eye pain. Patient woke up tonight with eye irritation, she described the itchiness is more bothersome then pain, the pain is only minimal, there is no vision decreased vision change or blurry vision, no pain with extraocular movement, patient did have some subjective feeling of facial swelling over the right side when she woke up, denies any other symptoms no fever. Does not wear contact       REVIEW OF SYSTEMS    Constitutional: Denies chills, fatigue, unexpected weight loss or fever. HENT: Denies sore throat or rhinorrhea. Eyes: Right eye irritation or redness and tearing  Respiratory: Denies shortness of breath or cough. Cardiovascular: Denies chest pain, leg swelling or palpitations. Gastrointestinal: Denies abdominal pain, diarrhea, nausea and vomiting. Genitourinary: Denies dysuria or hematuria. Skin: Denies rashes or wounds. MSK: Denies joint pain. Neurologic: Denies headache, lightheadedness, numbness, or weakness. Hematologic/lymphatic: Denies unexpected weight loss, night sweats  Endocrine: No polyuria, polydipsia, or polyphagia      Pertinent positives and negatives are delineated in HPI and ROS above, all other systems are reviewed and are negative    PAST MEDICAL HISTORY    Past Medical History:   Diagnosis Date    Asthma     Depression     Seasonal allergies      Medical history reviewed and no pertinent past medical history other than the ones mentioned above    SURGICAL HISTORY    History reviewed. No pertinent surgical history.   Surgical history reviewed and no pertinent surgical history other than the ones mentioned above    CURRENT MEDICATIONS Current Outpatient Rx   Medication Sig Dispense Refill    ketotifen (ZADITOR) 0.025 % ophthalmic solution Place 1 drop into the right eye 3 times daily as needed (eye itchiness) 1 mL 0    dicyclomine (BENTYL) 10 MG capsule Take 1 capsule by mouth 4 times daily (before meals and nightly) 20 capsule 0    famotidine (PEPCID) 20 MG tablet Take 1 tablet by mouth 2 times daily 30 tablet 0    naproxen (NAPROSYN) 500 MG tablet Take 1 tablet by mouth 2 times daily 15 tablet 0    ARIPiprazole (ABILIFY) 15 MG tablet Take 15 mg by mouth daily      methocarbamol (ROBAXIN) 500 MG tablet Take 1 tablet by mouth 4 times daily As needed for muscle spasm. 20 tablet 0     Medication is reviewed    ALLERGIES    No Known Allergies  Allergy is reviewed    FAMILY HISTORY    History reviewed. No pertinent family history. Family history reviewed and no pertinent family history other than the ones mentioned above    SOCIAL HISTORY    Social History     Socioeconomic History    Marital status: Single     Spouse name: Not on file    Number of children: Not on file    Years of education: Not on file    Highest education level: Not on file   Occupational History    Not on file   Tobacco Use    Smoking status: Current Every Day Smoker     Types: Cigarettes, E-Cigarettes    Smokeless tobacco: Never Used   Substance and Sexual Activity    Alcohol use: Not Currently     Comment: rare    Drug use: Not Currently     Types: Marijuana Darryle Pimple)    Sexual activity: Never   Other Topics Concern    Not on file   Social History Narrative    Not on file     Social Determinants of Health     Financial Resource Strain:     Difficulty of Paying Living Expenses: Not on file   Food Insecurity:     Worried About [address] in the Last Year: Not on file    [address] in the Last Year: Not on file   Transportation Needs:     Lack of Transportation (Medical): Not on file    Lack of Transportation (Non-Medical):  Not on file Physical Activity:     Days of Exercise per Week: Not on file    Minutes of Exercise per Session: Not on file   Stress:     Feeling of Stress : Not on file   Social Connections:     Frequency of Communication with Friends and Family: Not on file    Frequency of Social Gatherings with Friends and Family: Not on file    Attends Mu-ism Services: Not on file    Active Member of [address] [place] [address] or Organizations: Not on file    Attends Club or Organization Meetings: Not on file    Marital Status: Not on file   Intimate Partner Violence:     Fear of Current or Ex-Partner: Not on file    Emotionally Abused: Not on file    Physically Abused: Not on file    Sexually Abused: Not on file   Housing Stability:     Unable to Pay for Housing in the Last Year: Not on file    Number of [place] in the Last Year: Not on file    Unstable Housing in the Last Year: Not on file     Live with self  Alcohol and recreational drug use: denies  Social history reviewed and no pertinent social history other than the ones mentioned above    PHYSICAL EXAM    Vital Signs: There were no vitals taken for this visit. I have reviewed the triage vital signs. Constitutional: Well nourished, well developed, appears stated age  Eyes: PERRL, right-sided conjunctival injections, no ciliary flush, pupils are equal round reactive, extraocular movement did not induce any pain, fluorescein stain did not demonstrate any uptake, lid eversion did not demonstrate any foreign body  HENT: NCAT, Neck supple without meningismus   CV: RRR, Warm, no edema  RESP: Normal RR, no increased respiratory efforts  GI: soft, non-distended  MSK: No gross deformities  Skin: Warm, dry. No rashes  Neuro: Alert, CNs II-XII grossly intact. Moving all 4 extremities  Psych: Appropriate mood and affect.     Labs:   Labs Reviewed - No data to display    Radiology:  No orders to display       I directly reviewed the images and radiology interpretation    ED COURSE  Assessment & Medical Decision Making:  Isai Borjas is a 24 y.o. female who presents with eye irritation is more itching than pain, patient developed conjunctival injections, given the minimal amount of pain that the patient has we did obtain fluorescein stain that is negative for any uptake, I am not concerned any corneal abrasion ulceration at this point, likely conjunctivitis given the acute onset today's, and no purulent drainage, I do not think bacterial etiology is likely just yet, patient will be prescribed ketotifen drops for symptom management and will be discharged home with PCP follow-up. DDx includes but not limited to:  Conjunctivitis, subconjunctival hemorrhage, iritis/uveitis, endophthalmitis, keratitis      Workup includes but not limited to: NA    Treatment includes but not limited to: NA    Critical care time: NA    Impression:   1. Conjunctivitis    Disposition: Discharge pending nursing triage and vital signs    This note dictated using Dragon medical voice recognition software. Attempts at proofreading were made, but errors may occasionally still occur.            Fernando Nunez MD  11/22/21 [id number]       Fernando Nunez MD  11/22/21 [id number]

## 2022-03-28 ENCOUNTER — HOSPITAL ENCOUNTER (EMERGENCY)
Age: 22
Discharge: HOME HEALTH | End: 2022-03-28
Attending: EMERGENCY MEDICINE
Payer: MEDICARE

## 2022-03-28 ENCOUNTER — APPOINTMENT (OUTPATIENT)
Dept: GENERAL RADIOLOGY | Age: 22
End: 2022-03-28
Payer: MEDICARE

## 2022-03-28 VITALS
WEIGHT: 270 LBS | TEMPERATURE: 97.8 F | RESPIRATION RATE: 16 BRPM | BODY MASS INDEX: 40.92 KG/M2 | DIASTOLIC BLOOD PRESSURE: 88 MMHG | SYSTOLIC BLOOD PRESSURE: 144 MMHG | HEIGHT: 68 IN | HEART RATE: 79 BPM | OXYGEN SATURATION: 100 %

## 2022-03-28 DIAGNOSIS — R05.9 COUGH: Primary | ICD-10-CM

## 2022-03-28 DIAGNOSIS — R06.02 SHORTNESS OF BREATH: ICD-10-CM

## 2022-03-28 LAB
BACTERIA: NEGATIVE /HPF
BILIRUBIN URINE: NEGATIVE MG/DL
BLOOD, URINE: ABNORMAL
CLARITY: CLEAR
COLOR: YELLOW
GLUCOSE, URINE: NEGATIVE MG/DL
INTERPRETATION: NORMAL
KETONES, URINE: NEGATIVE MG/DL
LEUKOCYTE ESTERASE, URINE: NEGATIVE
NITRITE URINE, QUANTITATIVE: NEGATIVE
PH, URINE: 7 (ref 5–8)
PREGNANCY, URINE: NEGATIVE
PROTEIN UA: NEGATIVE MG/DL
RBC URINE: <1 /HPF (ref 0–6)
SPECIFIC GRAVITY UA: 1.01 (ref 1–1.03)
SPECIFIC GRAVITY, URINE: 1.01 (ref 1–1.03)
SQUAMOUS EPITHELIAL: <1 /HPF
UROBILINOGEN, URINE: NORMAL MG/DL (ref 0.2–1)
WBC UA: ABNORMAL /HPF (ref 0–5)

## 2022-03-28 PROCEDURE — U0005 INFEC AGEN DETEC AMPLI PROBE: HCPCS

## 2022-03-28 PROCEDURE — U0003 INFECTIOUS AGENT DETECTION BY NUCLEIC ACID (DNA OR RNA); SEVERE ACUTE RESPIRATORY SYNDROME CORONAVIRUS 2 (SARS-COV-2) (CORONAVIRUS DISEASE [COVID-19]), AMPLIFIED PROBE TECHNIQUE, MAKING USE OF HIGH THROUGHPUT TECHNOLOGIES AS DESCRIBED BY CMS-2020-01-R: HCPCS

## 2022-03-28 PROCEDURE — 93005 ELECTROCARDIOGRAM TRACING: CPT | Performed by: PHYSICIAN ASSISTANT

## 2022-03-28 PROCEDURE — 99284 EMERGENCY DEPT VISIT MOD MDM: CPT

## 2022-03-28 PROCEDURE — 81025 URINE PREGNANCY TEST: CPT

## 2022-03-28 PROCEDURE — 71046 X-RAY EXAM CHEST 2 VIEWS: CPT

## 2022-03-28 PROCEDURE — 81001 URINALYSIS AUTO W/SCOPE: CPT

## 2022-03-28 RX ORDER — BENZONATATE 100 MG/1
100 CAPSULE ORAL 3 TIMES DAILY PRN
Qty: 10 CAPSULE | Refills: 0 | Status: SHIPPED | OUTPATIENT
Start: 2022-03-28 | End: 2022-04-04

## 2022-03-28 ASSESSMENT — PAIN DESCRIPTION - LOCATION: LOCATION: RIB CAGE

## 2022-03-28 ASSESSMENT — PAIN DESCRIPTION - ORIENTATION: ORIENTATION: LEFT

## 2022-03-28 ASSESSMENT — PAIN DESCRIPTION - PAIN TYPE: TYPE: ACUTE PAIN

## 2022-03-28 ASSESSMENT — PAIN - FUNCTIONAL ASSESSMENT: PAIN_FUNCTIONAL_ASSESSMENT: 0-10

## 2022-03-28 ASSESSMENT — PAIN SCALES - GENERAL: PAINLEVEL_OUTOF10: 7

## 2022-03-28 NOTE — Clinical Note
Ruby Mann was seen and treated in our emergency department on 3/28/2022. She may return to work on 03/29/2022. If you have any questions or concerns, please don't hesitate to call.       Reginald Wells MD

## 2022-03-29 ENCOUNTER — CARE COORDINATION (OUTPATIENT)
Dept: CARE COORDINATION | Age: 22
End: 2022-03-29

## 2022-03-29 LAB
EKG ATRIAL RATE: 97 BPM
EKG DIAGNOSIS: NORMAL
EKG P AXIS: 57 DEGREES
EKG P-R INTERVAL: 186 MS
EKG Q-T INTERVAL: 338 MS
EKG QRS DURATION: 92 MS
EKG QTC CALCULATION (BAZETT): 429 MS
EKG R AXIS: 50 DEGREES
EKG T AXIS: 28 DEGREES
EKG VENTRICULAR RATE: 97 BPM
SARS-COV-2: NOT DETECTED
SOURCE: NORMAL

## 2022-03-29 PROCEDURE — 93010 ELECTROCARDIOGRAM REPORT: CPT | Performed by: INTERNAL MEDICINE

## 2022-03-29 NOTE — ED PROVIDER NOTES
Triage Chief Complaint:   Shortness of Breath (Reports having asthma and having pain under L breast )    [place]:  Guillermo Coffey is a 20 y.o. female that presents with cough and shortness of breath. Patient states that for the past week she has had a productive cough with clear phlegm, congestion, runny nose. States that it may be her allergies acting up but she has not had improvement with Claritin. Patient states that over the past day she has had some worsening shortness of breath with no alleviating or exacerbating factors. Denies any fever, chills, body aches, headaches, nausea, vomiting, diarrhea. Denies any abdominal pain. Also concerned because she is a week late on her menstrual cycle and may be pregnant. No abnormal vaginal bleeding or discharge. ROS:  At least 10 systems reviewed and otherwise acutely negative except as in the [address] Past Medical History:   Diagnosis Date    Asthma     Depression     Seasonal allergies      History reviewed. No pertinent surgical history. History reviewed. No pertinent family history.   Social History     Socioeconomic History    Marital status: Single     Spouse name: Not on file    Number of children: Not on file    Years of education: Not on file    Highest education level: Not on file   Occupational History    Not on file   Tobacco Use    Smoking status: Current Every Day Smoker     Types: Cigarettes, E-Cigarettes    Smokeless tobacco: Never Used   Substance and Sexual Activity    Alcohol use: Not Currently     Comment: rare    Drug use: Yes     Types: Marijuana Laly Bills)    Sexual activity: Never   Other Topics Concern    Not on file   Social History Narrative    Not on file     Social Determinants of Health     Financial Resource Strain:     Difficulty of Paying Living Expenses: Not on file   Food Insecurity:     Worried About Running Out of Food in the Last Year: Not on file    [place] of Food in the Last Year: Not on [place] Needs:     Lack of Transportation (Medical): Not on file    Lack of Transportation (Non-Medical): Not on file   Physical Activity:     Days of Exercise per Week: Not on file    Minutes of Exercise per Session: Not on file   Stress:     Feeling of Stress : Not on file   Social Connections:     Frequency of Communication with Friends and Family: Not on file    Frequency of Social Gatherings with Friends and Family: Not on file    Attends Hindu Services: Not on file    Active Member of [address] [place] [address] or Organizations: Not on file    Attends Club or Organization Meetings: Not on file    Marital Status: Not on file   Intimate Partner Violence:     Fear of Current or Ex-Partner: Not on file    Emotionally Abused: Not on file    Physically Abused: Not on file    Sexually Abused: Not on file   Housing Stability:     Unable to Pay for Housing in the Last Year: Not on file    Number of [place] in the Last Year: Not on file    Unstable Housing in the Last Year: Not on file     No current facility-administered medications for this encounter. Current Outpatient Medications   Medication Sig Dispense Refill    benzonatate (TESSALON PERLES) 100 MG capsule Take 1 capsule by mouth 3 times daily as needed for Cough 10 capsule 0    dicyclomine (BENTYL) 10 MG capsule Take 1 capsule by mouth 4 times daily (before meals and nightly) 20 capsule 0    famotidine (PEPCID) 20 MG tablet Take 1 tablet by mouth 2 times daily 30 tablet 0    naproxen (NAPROSYN) 500 MG tablet Take 1 tablet by mouth 2 times daily 15 tablet 0    ARIPiprazole (ABILIFY) 15 MG tablet Take 15 mg by mouth daily      methocarbamol (ROBAXIN) 500 MG tablet Take 1 tablet by mouth 4 times daily As needed for muscle spasm.  20 tablet 0     No Known Allergies    Nursing Notes Reviewed    Physical Exam:  ED Triage Vitals   Enc Vitals Group      BP 03/28/22 2055 (!) 154/108      Pulse 03/28/22 2055 87      Resp 03/28/22 2055 18      Temp 03/28/22 2055 97.8 °F (36.6 °C)      Temp Source 03/28/22 2055 Oral      SpO2 03/28/22 2055 100 %      Weight 03/28/22 2047 270 lb (122.5 kg)      Height 03/28/22 2047 5' 8\" (1.727 m)      Head Circumference --       Peak Flow --       Pain Score --       Pain Loc --       Pain Edu? --       Excl. in [address]? --      GENERAL APPEARANCE: Awake and alert. Cooperative. No acute distress. HEAD: Normocephalic. Atraumatic. EYES: EOM's grossly intact. Sclera anicteric. ENT: Mucous membranes are moist. Tolerates saliva. No trismus. NECK: Supple. No meningismus. Trachea midline. HEART: RRR. Radial pulses 2+. LUNGS: Respirations unlabored. CTAB  ABDOMEN: Soft. Non-tender. No guarding or rebound. EXTREMITIES: No acute deformities. SKIN: Warm and dry. NEUROLOGICAL: No gross facial drooping. Moves all 4 extremities spontaneously. PSYCHIATRIC: Normal mood.     I have reviewed and interpreted all of the currently available lab results from this visit (if applicable):  Results for orders placed or performed during the hospital encounter of 03/28/22   Pregnancy, Urine   Result Value Ref Range    Pregnancy, Urine NEGATIVE NEGATIVE    Specific Gravity, Urine 1.015 1.001 - 1.035    Interpretation HCG METHOD LIMITATIONS:    Urinalysis with Microscopic   Result Value Ref Range    Color, UA YELLOW YELLOW    Clarity, UA CLEAR CLEAR    Glucose, Urine NEGATIVE NEGATIVE MG/DL    Bilirubin Urine NEGATIVE NEGATIVE MG/DL    Ketones, Urine NEGATIVE NEGATIVE MG/DL    Specific Gravity, UA 1.015 1.001 - 1.035    Blood, Urine TRACE (A) NEGATIVE    pH, Urine 7.0 5.0 - 8.0    Protein, UA NEGATIVE NEGATIVE MG/DL    Urobilinogen, Urine NORMAL 0.2 - 1.0 MG/DL    Nitrite Urine, Quantitative NEGATIVE NEGATIVE    Leukocyte Esterase, Urine NEGATIVE NEGATIVE    RBC, UA <1 0 - 6 /HPF    WBC, UA NONE SEEN 0 - 5 /HPF    Bacteria, UA NEGATIVE NEGATIVE /HPF    Squam Epithel, UA <1 /HPF      Radiographs (if obtained):  [] The following radiograph was interpreted by myself in

## 2022-03-29 NOTE — ED PROVIDER NOTES
As physician assistant-in-triage, I performed a medical screening history and physical exam on this patient. HISTORY OF PRESENT ILLNESS  Marquez Mtz is a 20 y.o. female who presents to the emergency department with 1 week of productive cough, shortness of breath since yesterday, and also reports that she has been more irritable than usual for the last week because she has not yet started her period and is concerned she could be pregnant. Patient denies abdominal pain, vaginal discharge, concern for sexually transmitted infections, wheezing. PHYSICAL EXAM  BP (!) 154/108   Pulse 87   Temp 97.8 °F (36.6 °C) (Oral)   Resp 18   Ht 5' 8\" (1.727 m)   Wt 270 lb (122.5 kg)   LMP 02/14/2022   SpO2 100%   BMI 41.05 kg/m²     On exam, the patient appears well-hydrated, well-nourished, and in no acute distress. Mucous membranes are moist. Speech is clear. Breathing is unlabored. Skin is dry. Mental status is normal. Moves all extremities, and is without facial droop. PLAN:  Diagnostics initiated as needed. Please see the full history, physical exam, and final disposition note by the other provider in the main emergency department. Comment: Please note this report has been produced using speech recognition software and may contain errors related to that system including errors in grammar, punctuation, and spelling, as well as words and phrases that may be inappropriate. If there are any questions or concerns please feel free to contact the dictating provider for clarification.         Billie Castillo PA-C  03/28/22 3842

## 2022-03-30 ENCOUNTER — CARE COORDINATION (OUTPATIENT)
Dept: CARE COORDINATION | Age: 22
End: 2022-03-30

## 2022-04-18 ENCOUNTER — HOSPITAL ENCOUNTER (EMERGENCY)
Age: 22
Discharge: HOME | End: 2022-04-18
Payer: MEDICARE

## 2022-04-18 VITALS
HEART RATE: 74 BPM | OXYGEN SATURATION: 97 % | TEMPERATURE: 97.7 F | DIASTOLIC BLOOD PRESSURE: 91 MMHG | BODY MASS INDEX: 40.92 KG/M2 | RESPIRATION RATE: 16 BRPM | WEIGHT: 270 LBS | SYSTOLIC BLOOD PRESSURE: 153 MMHG | HEIGHT: 68 IN

## 2022-04-18 DIAGNOSIS — J06.9 ACUTE UPPER RESPIRATORY INFECTION: Primary | ICD-10-CM

## 2022-04-18 DIAGNOSIS — R05.9 COUGH: ICD-10-CM

## 2022-04-18 LAB
SARS-COV-2: NOT DETECTED
SOURCE: NORMAL

## 2022-04-18 PROCEDURE — 6370000000 HC RX 637 (ALT 250 FOR IP): Performed by: PHYSICIAN ASSISTANT

## 2022-04-18 PROCEDURE — 87430 STREP A AG IA: CPT

## 2022-04-18 PROCEDURE — 87081 CULTURE SCREEN ONLY: CPT

## 2022-04-18 PROCEDURE — 99283 EMERGENCY DEPT VISIT LOW MDM: CPT

## 2022-04-18 PROCEDURE — U0003 INFECTIOUS AGENT DETECTION BY NUCLEIC ACID (DNA OR RNA); SEVERE ACUTE RESPIRATORY SYNDROME CORONAVIRUS 2 (SARS-COV-2) (CORONAVIRUS DISEASE [COVID-19]), AMPLIFIED PROBE TECHNIQUE, MAKING USE OF HIGH THROUGHPUT TECHNOLOGIES AS DESCRIBED BY CMS-2020-01-R: HCPCS

## 2022-04-18 PROCEDURE — U0005 INFEC AGEN DETEC AMPLI PROBE: HCPCS

## 2022-04-18 RX ORDER — GUAIFENESIN/DEXTROMETHORPHAN 100-10MG/5
5 SYRUP ORAL ONCE
Status: COMPLETED | OUTPATIENT
Start: 2022-04-18 | End: 2022-04-18

## 2022-04-18 RX ORDER — ALBUTEROL SULFATE 90 UG/1
2 AEROSOL, METERED RESPIRATORY (INHALATION) EVERY 6 HOURS PRN
Qty: 18 G | Refills: 0 | Status: SHIPPED | OUTPATIENT
Start: 2022-04-18

## 2022-04-18 RX ORDER — ACETAMINOPHEN 500 MG
1000 TABLET ORAL ONCE
Status: COMPLETED | OUTPATIENT
Start: 2022-04-18 | End: 2022-04-18

## 2022-04-18 RX ADMIN — GUAIFENESIN AND DEXTROMETHORPHAN 5 ML: 100; 10 SYRUP ORAL at 05:26

## 2022-04-18 RX ADMIN — ACETAMINOPHEN 1000 MG: 500 TABLET ORAL at 05:26

## 2022-04-18 ASSESSMENT — PAIN SCALES - GENERAL
PAINLEVEL_OUTOF10: 9
PAINLEVEL_OUTOF10: 9

## 2022-04-18 ASSESSMENT — PAIN - FUNCTIONAL ASSESSMENT: PAIN_FUNCTIONAL_ASSESSMENT: 0-10

## 2022-04-18 ASSESSMENT — PAIN DESCRIPTION - LOCATION: LOCATION: THROAT

## 2022-04-18 ASSESSMENT — PAIN DESCRIPTION - PAIN TYPE: TYPE: ACUTE PAIN

## 2022-04-18 NOTE — ED PROVIDER NOTES
EMERGENCY DEPARTMENT ENCOUNTER      PCP: No primary care provider on file. CHIEF COMPLAINT    Chief Complaint   Patient presents with    Pharyngitis     This patient was not evaluated by the attending physician. I have independently evaluated this patient. HPI    Binta Fontaine is a 20 y.o. female who presents with sore throat, nasal congestion and cough. Onset 2 to 3 days ago. Patient denies sick contacts. Patient has had associated nausea and diarrhea. Patient denies vomiting, abdominal pain, urinary symptoms. Patient denies pregnancy. Patient states he has history of asthma and has had some increased wheezing and shortness of breath. Patient denies chest pain. Patient denies lower extremity pain or swelling. Patient has had associated hot and cold chills. REVIEW OF SYSTEMS    Constitutional: See HPI  HEENT:  See above  Cardiovascular:  Denies chest pain  Respiratory: See HPI  GI: See HPI denies abdominal pain  :  Denies any urinary symptoms  Neurologic:  Denies syncope       All other review of systems are negative  See HPI and nursing notes for additional information       PAST MEDICAL AND SURGICAL HISTORY    Past Medical History:   Diagnosis Date    Asthma     Depression     Seasonal allergies      History reviewed. No pertinent surgical history. CURRENT MEDICATIONS    Current Outpatient Rx   Medication Sig Dispense Refill    albuterol sulfate  (90 Base) MCG/ACT inhaler Inhale 2 puffs into the lungs every 6 hours as needed for Wheezing or Shortness of Breath (or cough) Please include spacer with instructions for use.  18 g 0    guaiFENesin (ROBITUSSIN) 100 MG/5ML liquid 1 teaspoon by mouth every 4-6 hours when necessary cough 120 mL 0    dicyclomine (BENTYL) 10 MG capsule Take 1 capsule by mouth 4 times daily (before meals and nightly) 20 capsule 0    famotidine (PEPCID) 20 MG tablet Take 1 tablet by mouth 2 times daily 30 tablet 0    naproxen (NAPROSYN) 500 MG tablet Take 1 tablet by mouth 2 times daily 15 tablet 0    ARIPiprazole (ABILIFY) 15 MG tablet Take 15 mg by mouth daily      methocarbamol (ROBAXIN) 500 MG tablet Take 1 tablet by mouth 4 times daily As needed for muscle spasm. 20 tablet 0       ALLERGIES    No Known Allergies    FAMILY AND SOCIAL HISTORY    History reviewed. No pertinent family history. Social History     Socioeconomic History    Marital status: Single     Spouse name: None    Number of children: None    Years of education: None    Highest education level: None   Occupational History    None   Tobacco Use    Smoking status: Current Every Day Smoker     Types: Cigarettes, E-Cigarettes    Smokeless tobacco: Never Used   Substance and Sexual Activity    Alcohol use: Not Currently     Comment: rare    Drug use: Yes     Types: Marijuana Rodena Capes)    Sexual activity: Never   Other Topics Concern    None   Social History Narrative    None     Social Determinants of Health     Financial Resource Strain:     Difficulty of Paying Living Expenses: Not on file   Food Insecurity:     Worried About Running Out of Food in the Last Year: Not on file    Tr[place] of Food in the Last Year: Not on file   Transportation Needs:     Lack of Transportation (Medical): Not on file    Lack of Transportation (Non-Medical):  Not on file   Physical Activity:     Days of Exercise per Week: Not on file    Minutes of Exercise per Session: Not on file   Stress:     Feeling of Stress : Not on file   Social Connections:     Frequency of Communication with Friends and Family: Not on file    Frequency of Social Gatherings with Friends and Family: Not on file    Attends Anabaptist Services: Not on file    Active Member of Clubs or Organizations: Not on file    Attends Club or Organization Meetings: Not on file    Marital Status: Not on file   Intimate Partner Violence:     Fear of Current or Ex-Partner: Not on file    Emotionally Abused: Not on file    Physically Abused: Not on file    Sexually Abused: Not on file   Housing Stability:     Unable to Pay for Housing in the Last Year: Not on file    Number of [place] in the Last Year: Not on file    Unstable Housing in the Last Year: Not on file       PHYSICAL EXAM    VITAL SIGNS: BP (!) 153/91   Pulse 74   Temp 97.7 °F (36.5 °C) (Oral)   Resp 16   Ht 5' 8\" (1.727 m)   Wt 270 lb (122.5 kg)   SpO2 97%   BMI 41.05 kg/m²   Constitutional:  Well developed, well nourished, no acute distress, non-toxic appearance   Eyes:    -PERRL, EOM intact. Conjunctiva normal, sclera non-icteric  HEENT:     - Normocephalic, atraumatic     -  Frontal/Maxillary sinuses NONtender to percussion.   - External auditory canals clear   - TMs clear without discharge, fluid, or bulging.   - Nasal passages with mildly erythematous and edematous turbinates. No massess.   - Oropharynx mildly erythematous with tonsillar hypertrophy, no exudates  Neck/Lymphatics:    - supple, no JVD, no swollen nodes   Respiratory:  Clear to auscultation bilateral lung fields, no wheezes/rales/rhonchi. No retractions, no accessory muscle use  Cardiovascular:  Normal rate, normal rhythm   GI:  Soft, no abdominal tenderness, no guarding. Musculoskeletal:  No obvious deficits, no edema   Integument:  Skin pink, warm, dry, intact     Labs:    Results for orders placed or performed during the hospital encounter of 04/18/22   Strep Screen Group A Throat    Specimen: Nares   Result Value Ref Range    Specimen NARES     Special Requests NONE     Strep A Direct Screen NEGATIVE            ED COURSE & MEDICAL DECISION MAKING      Patient presents as above. Patient is well-appearing, nontoxic. Vital signs are stable. Lungs clear bilaterally. Rapid strep is negative. Patient is swabbed for Covid. I discussed with patient that symptoms are likely viral in nature. Recommend symptomatic treatment increase rest, fluids, warm salt water gargles and tea with honey. Patient provided cough medication and rescue inhaler. Recommend follow-up with primary care provider in 3 to 5 days for recheck. Clinical  IMPRESSION    1. Acute upper respiratory infection    2. Cough          Diagnosis and plan discussed in detail with patient who understands and agrees. Patient agrees to return emergency department if symptoms worsen or any new symptoms develop. Comment: Please note this report has been produced using speech recognition software and may contain errors related to that system including errors in grammar, punctuation, and spelling, as well as words and phrases that may be inappropriate. If there are any questions or concerns please feel free to contact the dictating provider for clarification.       Bob Domínguez PA-C  04/18/22 7501

## 2022-04-20 LAB
CULTURE: NORMAL
Lab: NORMAL
SPECIMEN: NORMAL
STREP A DIRECT SCREEN: NEGATIVE

## 2022-05-12 ENCOUNTER — HOSPITAL ENCOUNTER (EMERGENCY)
Age: 22
Discharge: HOME | End: 2022-05-12
Payer: MEDICARE

## 2022-05-12 VITALS
HEART RATE: 92 BPM | RESPIRATION RATE: 15 BRPM | DIASTOLIC BLOOD PRESSURE: 89 MMHG | SYSTOLIC BLOOD PRESSURE: 138 MMHG | TEMPERATURE: 97.9 F | OXYGEN SATURATION: 99 %

## 2022-05-12 DIAGNOSIS — S29.012A STRAIN OF THORACIC BACK REGION: Primary | ICD-10-CM

## 2022-05-12 PROCEDURE — 99284 EMERGENCY DEPT VISIT MOD MDM: CPT

## 2022-05-12 PROCEDURE — 6360000002 HC RX W HCPCS: Performed by: PHYSICIAN ASSISTANT

## 2022-05-12 PROCEDURE — 96372 THER/PROPH/DIAG INJ SC/IM: CPT

## 2022-05-12 RX ORDER — CYCLOBENZAPRINE HCL 10 MG
10 TABLET ORAL 3 TIMES DAILY PRN
Qty: 10 TABLET | Refills: 0 | Status: SHIPPED | OUTPATIENT
Start: 2022-05-12 | End: 2022-05-19

## 2022-05-12 RX ORDER — KETOROLAC TROMETHAMINE 30 MG/ML
30 INJECTION, SOLUTION INTRAMUSCULAR; INTRAVENOUS ONCE
Status: COMPLETED | OUTPATIENT
Start: 2022-05-12 | End: 2022-05-12

## 2022-05-12 RX ORDER — NAPROXEN 500 MG/1
500 TABLET ORAL 2 TIMES DAILY
Qty: 15 TABLET | Refills: 0 | Status: SHIPPED | OUTPATIENT
Start: 2022-05-12

## 2022-05-12 RX ADMIN — KETOROLAC TROMETHAMINE 30 MG: 30 INJECTION, SOLUTION INTRAMUSCULAR; INTRAVENOUS at 05:37

## 2022-05-12 ASSESSMENT — PAIN DESCRIPTION - DESCRIPTORS: DESCRIPTORS: ACHING

## 2022-05-12 ASSESSMENT — PAIN SCALES - GENERAL
PAINLEVEL_OUTOF10: 9
PAINLEVEL_OUTOF10: 9

## 2022-05-12 ASSESSMENT — PAIN - FUNCTIONAL ASSESSMENT: PAIN_FUNCTIONAL_ASSESSMENT: 0-10

## 2022-05-12 ASSESSMENT — PAIN DESCRIPTION - LOCATION: LOCATION: BACK

## 2022-05-12 NOTE — ED PROVIDER NOTES
EMERGENCY DEPARTMENT ENCOUNTER      PCP: No primary care provider on file. CHIEF COMPLAINT    Chief Complaint   Patient presents with    Back Pain     above the middle of back; states that she had a back spasm at work and wasn't able to move       This patient was not evaluated by the attending physician. I have independently evaluated this patient. HPI    Ephraim Bender is a 20 y.o. female who presents with back pain, located in the mid back region. The onset was prior to arrival. Context is patient states she was bending over lifting items at work when she started having mid back pain. Patient denies any falling type injury. The duration has been since the onset. No radiation of pain. The pain worsens with movement. No known alleviating factors. REVIEW OF SYSTEMS    Constitutional:  Denies fever. Denies history of IVDA. Cardiovascular:  Denies chest pain  Respiratory:  Denies cough or shortness of breath    GI:  Denies abdominal pain, vomiting, or diarrhea  :  Denies any urinary symptoms. Denies pregnancy. Musculoskeletal:  See HPI above   Skin:  Denies rash  Neurologic:   No bowel incontinence or bladder retention, No saddle anesthesia, No radicular symptoms. No extremity weakness or altered sensation. Lymphatic:  Denies swollen glands     All other review of systems are negative  See HPI and nursing notes for additional information       PAST MEDICAL & SURGICAL HISTORY    Past Medical History:   Diagnosis Date    Asthma     Depression     Seasonal allergies      History reviewed. No pertinent surgical history.     CURRENT MEDICATIONS    Current Outpatient Rx   Medication Sig Dispense Refill    naproxen (NAPROSYN) 500 MG tablet Take 1 tablet by mouth 2 times daily 15 tablet 0    cyclobenzaprine (FLEXERIL) 10 MG tablet Take 1 tablet by mouth 3 times daily as needed for Muscle spasms 10 tablet 0    albuterol sulfate  (90 Base) MCG/ACT inhaler Inhale 2 puffs into the lungs every 6 hours as needed for Wheezing or Shortness of Breath (or cough) Please include spacer with instructions for use. 18 g 0    guaiFENesin (ROBITUSSIN) 100 MG/5ML liquid 1 teaspoon by mouth every 4-6 hours when necessary cough 120 mL 0    dicyclomine (BENTYL) 10 MG capsule Take 1 capsule by mouth 4 times daily (before meals and nightly) 20 capsule 0    famotidine (PEPCID) 20 MG tablet Take 1 tablet by mouth 2 times daily 30 tablet 0    ARIPiprazole (ABILIFY) 15 MG tablet Take 15 mg by mouth daily         ALLERGIES    No Known Allergies    SOCIAL HISTORY    Social History     Socioeconomic History    Marital status: Single     Spouse name: None    Number of children: None    Years of education: None    Highest education level: None   Occupational History    None   Tobacco Use    Smoking status: Current Every Day Smoker     Types: Cigarettes, E-Cigarettes    Smokeless tobacco: Never Used   Substance and Sexual Activity    Alcohol use: Not Currently     Comment: rare    Drug use: Yes     Types: Marijuana Elfrieda )    Sexual activity: Never   Other Topics Concern    None   Social History Narrative    None     Social Determinants of Health     Financial Resource Strain:     Difficulty of Paying Living Expenses: Not on file   Food Insecurity:     Worried About Running Out of Food in the Last Year: Not on file    Tristin of Food in the Last Year: Not on file   Transportation Needs:     Lack of Transportation (Medical): Not on file    Lack of Transportation (Non-Medical):  Not on file   Physical Activity:     Days of Exercise per Week: Not on file    Minutes of Exercise per Session: Not on file   Stress:     Feeling of Stress : Not on file   Social Connections:     Frequency of Communication with Friends and Family: Not on file    Frequency of Social Gatherings with Friends and Family: Not on file    Attends Roman Catholic Services: Not on file    Active Member of Clubs or Organizations: Not on file   [place]olph Attends Club or Organization Meetings: Not on file    Marital Status: Not on file   Intimate Partner Violence:     Fear of Current or Ex-Partner: Not on file    Emotionally Abused: Not on file    Physically Abused: Not on file    Sexually Abused: Not on file   Housing Stability:     Unable to Pay for Housing in the Last Year: Not on file    Number of [place] in the Last Year: Not on file    Unstable Housing in the Last Year: Not on file       PHYSICAL EXAM    VITAL SIGNS: /89   Pulse 92   Temp 97.9 °F (36.6 °C) (Oral)   Resp 15   SpO2 99%    Patient was noted to be afebrile    Constitutional:  Well developed, well nourished. HENT:  Atraumatic,  Moist mucus membranes. Eyes:  No orbital trauma. No scleral icterus. Neck: No JVD, supple. No enlarged lymph nodes. Cardiovascular:  Normal rate, regular rhythm  Respiratory:  No respiratory distress, normal breath sounds. Abdomen: Bowel sounds normal, Soft, No tenderness, no masses. Musculoskeletal:  No edema, no deformities. Back:   - No gross deformity, swelling, or discoloration.    - + Generalized thoracic tenderness without masses, fluctuance, warmth, or skin changes. No CVA tenderness to percussion  Integument:  Well hydrated, no rash, no pallor. Neurologic:    No obvious neurological deficits. Patient moves without difficulty or weakness. Motor & Sensation intact bilateral upper and lower extremities. Vascular:  Distal pulses intact bilateral upper and lower extremities        ED COURSE & MEDICAL DECISION MAKING      Patient presents as above. Patient is well-appearing, nontoxic. Vital signs are stable. Lungs are clear, patient denies chest pain or shortness of breath. Abdomen soft, nontender. Patient denies any urinary symptoms. I suspect patient's back pain is musculoskeletal in nature, likely strain. Patient provided Toradol while in emergency department. Patient adamantly denies pregnancy.   Patient provided prescription for naproxen and Flexeril. Recommend gentle range of motion stretching, massage and heat. Recommend follow-up with primary care provider in 3 to 5 days for recheck. Clinical  IMPRESSION    1. Strain of thoracic back region          Diagnosis and plan discussed in detail with patient who understands and agrees. Patient agrees to return emergency department if symptoms worsen or any new symptoms develop. Disposition referral (if applicable):  David Be MD  [facility]  [phone]    Schedule an appointment as soon as possible for a visit in 3 days  For Recheck, Return to ED if symptoms change or worsen    Disposition medications (if applicable):  New Prescriptions    CYCLOBENZAPRINE (FLEXERIL) 10 MG TABLET    Take 1 tablet by mouth 3 times daily as needed for Muscle spasms    NAPROXEN (NAPROSYN) 500 MG TABLET    Take 1 tablet by mouth 2 times daily         Comment: Please note this report has been produced using speech recognition software and may contain errors related to that system including errors in grammar, punctuation, and spelling, as well as words and phrases that may be inappropriate. If there are any questions or concerns please feel free to contact the dictating provider for clarification.       Emilee Blandon PA-C  05/12/22 8857

## 2022-05-12 NOTE — LETTER
[facility] Emergency Department  [address] [id number]  Phone: [phone]  Fax: [phone]               May 12, 2022    Patient: Eugenio Patricia   YOB: 2000   Date of Visit: 5/12/2022       To Whom It May Concern:    Mortimer Mustard was seen and treated in our emergency department on 5/12/2022.  She may return to work on 5/14/2022      Sincerely,       Suzanne Contreras PA-C         Signature:__________________________________

## 2022-06-02 ENCOUNTER — HOSPITAL ENCOUNTER (EMERGENCY)
Age: 22
Discharge: HOME | End: 2022-06-02
Payer: MEDICARE

## 2022-06-02 ENCOUNTER — APPOINTMENT (OUTPATIENT)
Dept: ULTRASOUND IMAGING | Age: 22
End: 2022-06-02
Payer: MEDICARE

## 2022-06-02 ENCOUNTER — APPOINTMENT (OUTPATIENT)
Dept: GENERAL RADIOLOGY | Age: 22
End: 2022-06-02
Payer: MEDICARE

## 2022-06-02 VITALS
HEIGHT: 68 IN | DIASTOLIC BLOOD PRESSURE: 68 MMHG | BODY MASS INDEX: 40.92 KG/M2 | HEART RATE: 82 BPM | TEMPERATURE: 98.5 F | WEIGHT: 270 LBS | RESPIRATION RATE: 16 BRPM | SYSTOLIC BLOOD PRESSURE: 113 MMHG | OXYGEN SATURATION: 99 %

## 2022-06-02 DIAGNOSIS — R10.10 PAIN OF UPPER ABDOMEN: ICD-10-CM

## 2022-06-02 DIAGNOSIS — R07.9 CHEST PAIN, UNSPECIFIED TYPE: Primary | ICD-10-CM

## 2022-06-02 LAB
ALBUMIN SERPL-MCNC: 4.3 GM/DL (ref 3.4–5)
ALP BLD-CCNC: 126 IU/L (ref 40–129)
ALT SERPL-CCNC: 32 U/L (ref 10–40)
ANION GAP SERPL CALCULATED.3IONS-SCNC: 12 MMOL/L (ref 4–16)
AST SERPL-CCNC: 22 IU/L (ref 15–37)
BASOPHILS ABSOLUTE: 0 K/CU MM
BASOPHILS RELATIVE PERCENT: 0.4 % (ref 0–1)
BILIRUB SERPL-MCNC: 0.3 MG/DL (ref 0–1)
BUN BLDV-MCNC: 11 MG/DL (ref 6–23)
CALCIUM SERPL-MCNC: 9.5 MG/DL (ref 8.3–10.6)
CHLORIDE BLD-SCNC: 100 MMOL/L (ref 99–110)
CO2: 23 MMOL/L (ref 21–32)
CREAT SERPL-MCNC: 0.7 MG/DL (ref 0.6–1.1)
D DIMER: <200 NG/ML(DDU)
DIFFERENTIAL TYPE: ABNORMAL
EOSINOPHILS ABSOLUTE: 0.2 K/CU MM
EOSINOPHILS RELATIVE PERCENT: 2 % (ref 0–3)
GFR AFRICAN AMERICAN: >60 ML/MIN/1.73M2
GFR NON-AFRICAN AMERICAN: >60 ML/MIN/1.73M2
GLUCOSE BLD-MCNC: 113 MG/DL (ref 70–99)
HCG QUALITATIVE: NEGATIVE
HCT VFR BLD CALC: 41.4 % (ref 37–47)
HEMOGLOBIN: 12.7 GM/DL (ref 12.5–16)
IMMATURE NEUTROPHIL %: 0.3 % (ref 0–0.43)
LIPASE: 37 IU/L (ref 13–60)
LYMPHOCYTES ABSOLUTE: 2.2 K/CU MM
LYMPHOCYTES RELATIVE PERCENT: 20.1 % (ref 24–44)
MCH RBC QN AUTO: 26.8 PG (ref 27–31)
MCHC RBC AUTO-ENTMCNC: 30.7 % (ref 32–36)
MCV RBC AUTO: 87.3 FL (ref 78–100)
MONOCYTES ABSOLUTE: 0.7 K/CU MM
MONOCYTES RELATIVE PERCENT: 6.7 % (ref 0–4)
NUCLEATED RBC %: 0 %
PDW BLD-RTO: 13.8 % (ref 11.7–14.9)
PLATELET # BLD: 257 K/CU MM (ref 140–440)
PMV BLD AUTO: 9.9 FL (ref 7.5–11.1)
POTASSIUM SERPL-SCNC: 3.9 MMOL/L (ref 3.5–5.1)
RBC # BLD: 4.74 M/CU MM (ref 4.2–5.4)
SARS-COV-2, NAAT: NOT DETECTED
SEGMENTED NEUTROPHILS ABSOLUTE COUNT: 7.7 K/CU MM
SEGMENTED NEUTROPHILS RELATIVE PERCENT: 70.5 % (ref 36–66)
SODIUM BLD-SCNC: 135 MMOL/L (ref 135–145)
SOURCE: NORMAL
TOTAL IMMATURE NEUTOROPHIL: 0.03 K/CU MM
TOTAL NUCLEATED RBC: 0 K/CU MM
TOTAL PROTEIN: 8.2 GM/DL (ref 6.4–8.2)
TROPONIN T: <0.01 NG/ML
TROPONIN T: <0.01 NG/ML
WBC # BLD: 10.9 K/CU MM (ref 4–10.5)

## 2022-06-02 PROCEDURE — 93005 ELECTROCARDIOGRAM TRACING: CPT | Performed by: NURSE PRACTITIONER

## 2022-06-02 PROCEDURE — 99285 EMERGENCY DEPT VISIT HI MDM: CPT

## 2022-06-02 PROCEDURE — 87635 SARS-COV-2 COVID-19 AMP PRB: CPT

## 2022-06-02 PROCEDURE — 71045 X-RAY EXAM CHEST 1 VIEW: CPT

## 2022-06-02 PROCEDURE — 84484 ASSAY OF TROPONIN QUANT: CPT

## 2022-06-02 PROCEDURE — 85379 FIBRIN DEGRADATION QUANT: CPT

## 2022-06-02 PROCEDURE — 85025 COMPLETE CBC W/AUTO DIFF WBC: CPT

## 2022-06-02 PROCEDURE — 80053 COMPREHEN METABOLIC PANEL: CPT

## 2022-06-02 PROCEDURE — 83690 ASSAY OF LIPASE: CPT

## 2022-06-02 PROCEDURE — 76705 ECHO EXAM OF ABDOMEN: CPT

## 2022-06-02 PROCEDURE — 2580000003 HC RX 258: Performed by: NURSE PRACTITIONER

## 2022-06-02 PROCEDURE — 84703 CHORIONIC GONADOTROPIN ASSAY: CPT

## 2022-06-02 RX ORDER — 0.9 % SODIUM CHLORIDE 0.9 %
1000 INTRAVENOUS SOLUTION INTRAVENOUS ONCE
Status: COMPLETED | OUTPATIENT
Start: 2022-06-02 | End: 2022-06-02

## 2022-06-02 RX ORDER — KETOROLAC TROMETHAMINE 30 MG/ML
15 INJECTION, SOLUTION INTRAMUSCULAR; INTRAVENOUS ONCE
Status: DISCONTINUED | OUTPATIENT
Start: 2022-06-02 | End: 2022-06-02 | Stop reason: HOSPADM

## 2022-06-02 RX ADMIN — SODIUM CHLORIDE 1000 ML: 9 INJECTION, SOLUTION INTRAVENOUS at 15:53

## 2022-06-02 ASSESSMENT — PAIN DESCRIPTION - LOCATION: LOCATION: CHEST

## 2022-06-02 ASSESSMENT — PAIN DESCRIPTION - DESCRIPTORS: DESCRIPTORS: DISCOMFORT

## 2022-06-02 ASSESSMENT — PAIN SCALES - GENERAL: PAINLEVEL_OUTOF10: 9

## 2022-06-02 NOTE — ED PROVIDER NOTES
EMERGENCY DEPARTMENT ENCOUNTER      PCP: No primary care provider on file. CHIEF COMPLAINT    Chief Complaint   Patient presents with    Chest Pain     started today when pt was laying down             HPI    Michael Patel is a 20 y.o. female who presents with complaints of anterior chest pain and bilateral upper abdominal pain. The patient states her symptoms began suddenly at 2 PM.  She states the chest pain as sharp and constant. She describes the abdominal pain as aching and constant. Nothing has alleviated her symptoms. She has not taken any medications. Nothing exacerbates her symptoms. She does have some associated shortness of breath. She denies any fevers, nausea, vomiting, dysuria, urinary frequency, vaginal complaints, recent periods of immobilization, unilateral leg pain or swelling, hemoptysis, malignancy, hormone therapy, blood or clotting disorders, or other symptoms. REVIEW OF SYSTEMS    Constitutional:  Denies fever, chills, weight loss or weakness   HENT:  Denies sore throat or ear pain   Cardiovascular: See HPI  Respiratory: See HPI  GI: See HPI  :  Denies any urinary symptoms or vaginal symptoms. Musculoskeletal:  Denies back pain  Skin:  Denies rash  Neurologic:  Denies headache, focal weakness or sensory changes   Endocrine:  Denies polyuria or polydypsia   Lymphatic:  Denies swollen glands     All other review of systems are negative  See HPI and nursing notes for additional information     PAST MEDICAL AND SURGICAL HISTORY    Past Medical History:   Diagnosis Date    Asthma     Depression     Seasonal allergies      History reviewed. No pertinent surgical history.     CURRENT MEDICATIONS    Current Outpatient Rx   Medication Sig Dispense Refill    naproxen (NAPROSYN) 500 MG tablet Take 1 tablet by mouth 2 times daily 15 tablet 0    albuterol sulfate  (90 Base) MCG/ACT inhaler Inhale 2 puffs into the lungs every 6 hours as needed for Wheezing or Shortness of Breath (or cough) Please include spacer with instructions for use. 18 g 0    guaiFENesin (ROBITUSSIN) 100 MG/5ML liquid 1 teaspoon by mouth every 4-6 hours when necessary cough 120 mL 0    dicyclomine (BENTYL) 10 MG capsule Take 1 capsule by mouth 4 times daily (before meals and nightly) 20 capsule 0    famotidine (PEPCID) 20 MG tablet Take 1 tablet by mouth 2 times daily 30 tablet 0    ARIPiprazole (ABILIFY) 15 MG tablet Take 15 mg by mouth daily         ALLERGIES    No Known Allergies    SOCIAL AND FAMILY HISTORY    Social History     Socioeconomic History    Marital status: Single     Spouse name: None    Number of children: None    Years of education: None    Highest education level: None   Occupational History    None   Tobacco Use    Smoking status: Current Every Day Smoker     Types: Cigarettes, E-Cigarettes    Smokeless tobacco: Never Used   Substance and Sexual Activity    Alcohol use: Not Currently     Comment: rare    Drug use: Yes     Types: Marijuana David Mcguire)    Sexual activity: Never   Other Topics Concern    None   Social History Narrative    None     Social Determinants of Health     Financial Resource Strain:     Difficulty of Paying Living Expenses: Not on file   Food Insecurity:     Worried About Running Out of Food in the Last Year: Not on file    [place] of Food in the Last Year: Not on file   Transportation Needs:     Lack of Transportation (Medical): Not on file    Lack of Transportation (Non-Medical):  Not on file   Physical Activity:     Days of Exercise per Week: Not on file    Minutes of Exercise per Session: Not on file   Stress:     Feeling of Stress : Not on file   Social Connections:     Frequency of Communication with Friends and Family: Not on file    Frequency of Social Gatherings with Friends and Family: Not on file    Attends [employer] Services: Not on file    Active Member of Clubs or Organizations: Not on file    Attends Club or Organization Meetings: Not on file    Marital Status: Not on file   Intimate Partner Violence:     Fear of Current or Ex-Partner: Not on file    Emotionally Abused: Not on file    Physically Abused: Not on file    Sexually Abused: Not on file   Housing Stability:     Unable to Pay for Housing in the Last Year: Not on file    Number of [place] in the Last Year: Not on file    Unstable Housing in the Last Year: Not on file     History reviewed. No pertinent family history. PHYSICAL EXAM    VITAL SIGNS: /68   Pulse 82   Temp 98.5 °F (36.9 °C) (Oral)   Resp 16   Ht 5' 8\" (1.727 m)   Wt 270 lb (122.5 kg)   SpO2 99%   BMI 41.05 kg/m²    Constitutional:  Well developed, Well nourished. No distress  HENT:  Normocephalic, Atraumatic, PERRL. EOMI. Sclera clear. Conjunctiva normal, No discharge. Neck/Lymphatics: supple, no JVD, no swollen nodes  Cardiovascular:   RRR,  no murmurs/rubs/gallops. No JVD  Respiratory:  Nonlabored breathing. Normal breath sounds, No wheezing  Abdomen: Bowel sounds normal, Soft, No tenderness to palpation, no masses. Negative Menjivar sign. No focal tenderness over McBurney's point. Musculoskeletal:    There is no edema, asymmetry, or calf / thigh tenderness bilaterally. No cyanosis. No cool or pale-appearing limb. Distal cap refill and pulses intact bilateral upper and lower extremities  Bilateral upper and lower extremity ROM intact without pain or obvious deficit  Integument:  Warm, Dry  Neurologic: Alert & oriented , No focal deficits noted. Cranial nerves II through XII grossly intact. Normal gross motor coordination & motor strength bilateral upper and lower extremities  Sensation intact.   Psychiatric:  Affect normal, Mood normal.       Labs:  Results for orders placed or performed during the hospital encounter of 06/02/22   COVID-19, Rapid    Specimen: Nasopharyngeal   Result Value Ref Range    Source THROAT     SARS-CoV-2, NAAT NOT DETECTED NOT DETECTED   CBC with Auto Differential   Result Value Ref Range    WBC 10.9 (H) 4.0 - 10.5 K/CU MM    RBC 4.74 4.2 - 5.4 M/CU MM    Hemoglobin 12.7 12.5 - 16.0 GM/DL    Hematocrit 41.4 37 - 47 %    MCV 87.3 78 - 100 FL    MCH 26.8 (L) 27 - 31 PG    MCHC 30.7 (L) 32.0 - 36.0 %    RDW 13.8 11.7 - 14.9 %    Platelets 510 679 - 307 K/CU MM    MPV 9.9 7.5 - 11.1 FL    Differential Type AUTOMATED DIFFERENTIAL     Segs Relative 70.5 (H) 36 - 66 %    Lymphocytes % 20.1 (L) 24 - 44 %    Monocytes % 6.7 (H) 0 - 4 %    Eosinophils % 2.0 0 - 3 %    Basophils % 0.4 0 - 1 %    Segs Absolute 7.7 K/CU MM    Lymphocytes Absolute 2.2 K/CU MM    Monocytes Absolute 0.7 K/CU MM    Eosinophils Absolute 0.2 K/CU MM    Basophils Absolute 0.0 K/CU MM    Nucleated RBC % 0.0 %    Total Nucleated RBC 0.0 K/CU MM    Total Immature Neutrophil 0.03 K/CU MM    Immature Neutrophil % 0.3 0 - 0.43 %   Comprehensive Metabolic Panel w/ Reflex to MG   Result Value Ref Range    Sodium 135 135 - 145 MMOL/L    Potassium 3.9 3.5 - 5.1 MMOL/L    Chloride 100 99 - 110 mMol/L    CO2 23 21 - 32 MMOL/L    BUN 11 6 - 23 MG/DL    CREATININE 0.7 0.6 - 1.1 MG/DL    Glucose 113 (H) 70 - 99 MG/DL    Calcium 9.5 8.3 - 10.6 MG/DL    Albumin 4.3 3.4 - 5.0 GM/DL    Total Protein 8.2 6.4 - 8.2 GM/DL    Total Bilirubin 0.3 0.0 - 1.0 MG/DL    ALT 32 10 - 40 U/L    AST 22 15 - 37 IU/L    Alkaline Phosphatase 126 40 - 129 IU/L    GFR Non-African American >60 >60 mL/min/1.73m2    GFR African American >60 >60 mL/min/1.73m2    Anion Gap 12 4 - 16   Troponin   Result Value Ref Range    Troponin T <0.010 <0.01 NG/ML   D-Dimer, Quantitative   Result Value Ref Range    D-Dimer, Quant <200 <230 NG/mL(DDU)   Lipase   Result Value Ref Range    Lipase 37 13 - 60 IU/L   HCG Serum, Qualitative   Result Value Ref Range    hCG Qual NEGATIVE    Troponin   Result Value Ref Range    Troponin T <0.010 <0.01 NG/ML           EKG    Sinus rhythm at 98 bpm with no ST or T wave elevation or depression    RADIOLOGY    US ABDOMEN LIMITED Specify organ? GALLBLADDER, LIVER, PANCREAS   Final Result   Technically limited study as above. The gallbladder is partially contracted. No gross evidence of   cholelithiasis. If there is continued clinical concern for gallbladder   disease, a follow-up study after fasting may be helpful. XR CHEST PORTABLE   Final Result   No acute cardiopulmonary disease. ED COURSE & MEDICAL DECISION MAKING       76-year-old female presents emergency department with complaints of anterior chest pain and right upper quadrant and epigastric pain that began at 2 PM.  She was brought to room and placed on monitor. EKG, labs, and imaging obtained. She was medicated with Toradol 15 mg IV. EKG shows a sinus rhythm at 98 bpm with no ST or T wave abnormalities. CBC shows a WBC of 10.9, hemoglobin and hematocrit are normal.  Platelets normal.  MP shows a glucose of 113 otherwise no significant electrolyte abnormalities or transaminitis. D-dimer negative. COVID-negative. hCG negative. Chest x-ray showed no acute findings. Ultrasound of the abdomen showed the gallbladder is partially contracted. No gross evidence of  cholelithiasis. If there is continued clinical concern for gallbladder  disease, a follow-up study after fasting may be helpful. On reassessment, the patient's symptoms resolved. She is afebrile and appears nontoxic. At this time, I feel she can be managed outpatient with close follow-up. Repeat abdominal exam benign. She was instructed to follow-up with a primary care provider within a week, alternate Tylenol and ibuprofen as directed for pain, maintain hydration, and return here with worsening symptoms. She verbalized understanding and agrees with plan of care. Patient agrees to return emergency department if symptoms worsen or any new symptoms develop. Vital signs and nursing notes reviewed during ED course.       DDx: ACS, PE, AAA, pneumonia, COVID, influenza, cholecystitis, pancreatitis, an acute surgical abdomen    Clinical  IMPRESSION    1. Chest pain, unspecified type    2. Pain of upper abdomen              Comment: Please note this report has been produced using speech recognition software and may contain errors related to that system including errors in grammar, punctuation, and spelling, as well as words and phrases that may be inappropriate. If there are any questions or concerns please feel free to contact the dictating provider for clarification.       HAYDEN Araya CNP  06/02/22 1944

## 2022-06-02 NOTE — ED TRIAGE NOTES
Pt states chest pain under left breast that started around 2 pm while laying flat. Denies any cardiac history.

## 2022-06-02 NOTE — Clinical Note
Demetra Mckeon was seen and treated in our emergency department on 6/2/2022. She may return to work on 06/04/2022. If you have any questions or concerns, please don't hesitate to call.       Schuyler Zapien, APRN - CNP

## 2022-06-02 NOTE — ACP (ADVANCE CARE PLANNING)
LSW notes hospital will follow [place]'s Next of Kin hierarchy in the following descending order for priority:    Guardian  Spouse  [de-identified] of adult Children  Parents  [de-identified] of adult Siblings  Nearest Relative not described above    Per [place]'s Next of Kin hierarchy:  Patients' Brother/Sister will be Primary Healthcare Decision Maker.

## 2022-06-03 LAB
EKG ATRIAL RATE: 98 BPM
EKG DIAGNOSIS: NORMAL
EKG P AXIS: 50 DEGREES
EKG P-R INTERVAL: 166 MS
EKG Q-T INTERVAL: 344 MS
EKG QRS DURATION: 90 MS
EKG QTC CALCULATION (BAZETT): 439 MS
EKG R AXIS: 54 DEGREES
EKG T AXIS: 26 DEGREES
EKG VENTRICULAR RATE: 98 BPM

## 2022-06-03 PROCEDURE — 93010 ELECTROCARDIOGRAM REPORT: CPT | Performed by: INTERNAL MEDICINE

## 2022-07-02 ENCOUNTER — APPOINTMENT (OUTPATIENT)
Dept: GENERAL RADIOLOGY | Age: 22
End: 2022-07-02
Payer: MEDICARE

## 2022-07-02 ENCOUNTER — HOSPITAL ENCOUNTER (EMERGENCY)
Age: 22
Discharge: LEFT BEFORE DISCHARGE | End: 2022-07-02
Attending: EMERGENCY MEDICINE
Payer: MEDICARE

## 2022-07-02 VITALS
RESPIRATION RATE: 19 BRPM | DIASTOLIC BLOOD PRESSURE: 86 MMHG | TEMPERATURE: 98.2 F | OXYGEN SATURATION: 99 % | HEART RATE: 71 BPM | SYSTOLIC BLOOD PRESSURE: 148 MMHG

## 2022-07-02 PROCEDURE — 4500000002 HC ER NO CHARGE

## 2022-07-02 PROCEDURE — 93005 ELECTROCARDIOGRAM TRACING: CPT | Performed by: EMERGENCY MEDICINE

## 2022-07-02 PROCEDURE — 84703 CHORIONIC GONADOTROPIN ASSAY: CPT

## 2022-07-02 PROCEDURE — 71045 X-RAY EXAM CHEST 1 VIEW: CPT

## 2022-07-02 PROCEDURE — 84484 ASSAY OF TROPONIN QUANT: CPT

## 2022-07-02 PROCEDURE — 80076 HEPATIC FUNCTION PANEL: CPT

## 2022-07-02 ASSESSMENT — PAIN DESCRIPTION - DESCRIPTORS: DESCRIPTORS: ACHING

## 2022-07-02 ASSESSMENT — PAIN DESCRIPTION - LOCATION: LOCATION: BACK

## 2022-07-02 ASSESSMENT — PAIN SCALES - GENERAL: PAINLEVEL_OUTOF10: 6

## 2022-07-02 ASSESSMENT — PAIN DESCRIPTION - PAIN TYPE: TYPE: ACUTE PAIN

## 2022-07-02 ASSESSMENT — PAIN DESCRIPTION - FREQUENCY: FREQUENCY: INTERMITTENT

## 2022-07-02 ASSESSMENT — PAIN DESCRIPTION - ORIENTATION: ORIENTATION: UPPER;RIGHT

## 2022-07-03 LAB
EKG ATRIAL RATE: 83 BPM
EKG DIAGNOSIS: NORMAL
EKG P AXIS: 53 DEGREES
EKG P-R INTERVAL: 184 MS
EKG Q-T INTERVAL: 342 MS
EKG QRS DURATION: 88 MS
EKG QTC CALCULATION (BAZETT): 401 MS
EKG R AXIS: 45 DEGREES
EKG T AXIS: 24 DEGREES
EKG VENTRICULAR RATE: 83 BPM

## 2022-07-03 PROCEDURE — 93010 ELECTROCARDIOGRAM REPORT: CPT | Performed by: INTERNAL MEDICINE

## 2022-09-10 ENCOUNTER — HOSPITAL ENCOUNTER (EMERGENCY)
Age: 22
Discharge: HOME | End: 2022-09-10
Payer: MEDICARE

## 2022-09-10 VITALS
OXYGEN SATURATION: 100 % | SYSTOLIC BLOOD PRESSURE: 155 MMHG | HEART RATE: 67 BPM | TEMPERATURE: 98.3 F | RESPIRATION RATE: 16 BRPM | DIASTOLIC BLOOD PRESSURE: 86 MMHG

## 2022-09-10 DIAGNOSIS — R10.30 LOWER ABDOMINAL PAIN: Primary | ICD-10-CM

## 2022-09-10 DIAGNOSIS — R11.0 NAUSEA: ICD-10-CM

## 2022-09-10 LAB
ALBUMIN SERPL-MCNC: 4.2 GM/DL (ref 3.4–5)
ALP BLD-CCNC: 121 IU/L (ref 40–128)
ALT SERPL-CCNC: 20 U/L (ref 10–40)
ANION GAP SERPL CALCULATED.3IONS-SCNC: 8 MMOL/L (ref 4–16)
AST SERPL-CCNC: 16 IU/L (ref 15–37)
BACTERIA: NEGATIVE /HPF
BASOPHILS ABSOLUTE: 0.1 K/CU MM
BASOPHILS RELATIVE PERCENT: 0.5 % (ref 0–1)
BILIRUB SERPL-MCNC: 0.2 MG/DL (ref 0–1)
BILIRUBIN URINE: NEGATIVE MG/DL
BLOOD, URINE: NEGATIVE
BUN BLDV-MCNC: 7 MG/DL (ref 6–23)
CALCIUM SERPL-MCNC: 9.7 MG/DL (ref 8.3–10.6)
CHLORIDE BLD-SCNC: 101 MMOL/L (ref 99–110)
CLARITY: CLEAR
CO2: 27 MMOL/L (ref 21–32)
COLOR: YELLOW
CREAT SERPL-MCNC: 0.7 MG/DL (ref 0.6–1.1)
DIFFERENTIAL TYPE: ABNORMAL
EOSINOPHILS ABSOLUTE: 0.2 K/CU MM
EOSINOPHILS RELATIVE PERCENT: 2.2 % (ref 0–3)
GFR AFRICAN AMERICAN: >60 ML/MIN/1.73M2
GFR NON-AFRICAN AMERICAN: >60 ML/MIN/1.73M2
GLUCOSE BLD-MCNC: 85 MG/DL (ref 70–99)
GLUCOSE, URINE: NEGATIVE MG/DL
GONADOTROPIN, CHORIONIC (HCG) QUANT: 0.5 UIU/ML
HCT VFR BLD CALC: 40.8 % (ref 37–47)
HEMOGLOBIN: 12.8 GM/DL (ref 12.5–16)
IMMATURE NEUTROPHIL %: 0.2 % (ref 0–0.43)
KETONES, URINE: NEGATIVE MG/DL
LEUKOCYTE ESTERASE, URINE: NEGATIVE
LIPASE: 27 IU/L (ref 13–60)
LYMPHOCYTES ABSOLUTE: 1.9 K/CU MM
LYMPHOCYTES RELATIVE PERCENT: 17.3 % (ref 24–44)
MCH RBC QN AUTO: 27 PG (ref 27–31)
MCHC RBC AUTO-ENTMCNC: 31.4 % (ref 32–36)
MCV RBC AUTO: 86.1 FL (ref 78–100)
MONOCYTES ABSOLUTE: 0.6 K/CU MM
MONOCYTES RELATIVE PERCENT: 6 % (ref 0–4)
NITRITE URINE, QUANTITATIVE: NEGATIVE
NUCLEATED RBC %: 0 %
PDW BLD-RTO: 13.2 % (ref 11.7–14.9)
PH, URINE: 7 (ref 5–8)
PLATELET # BLD: 263 K/CU MM (ref 140–440)
PMV BLD AUTO: 9.7 FL (ref 7.5–11.1)
POTASSIUM SERPL-SCNC: 4 MMOL/L (ref 3.5–5.1)
PROTEIN UA: NEGATIVE MG/DL
RBC # BLD: 4.74 M/CU MM (ref 4.2–5.4)
RBC URINE: NORMAL /HPF (ref 0–6)
REASON FOR REJECTION: NORMAL
REJECTED TEST: NORMAL
SEGMENTED NEUTROPHILS ABSOLUTE COUNT: 7.9 K/CU MM
SEGMENTED NEUTROPHILS RELATIVE PERCENT: 73.8 % (ref 36–66)
SODIUM BLD-SCNC: 136 MMOL/L (ref 135–145)
SPECIFIC GRAVITY UA: <1.005 (ref 1–1.03)
SQUAMOUS EPITHELIAL: <1 /HPF
TOTAL IMMATURE NEUTOROPHIL: 0.02 K/CU MM
TOTAL NUCLEATED RBC: 0 K/CU MM
TOTAL PROTEIN: 7.5 GM/DL (ref 6.4–8.2)
TRICHOMONAS: NORMAL /HPF
UROBILINOGEN, URINE: 0.2 MG/DL (ref 0.2–1)
WBC # BLD: 10.8 K/CU MM (ref 4–10.5)
WBC UA: 2 /HPF (ref 0–5)

## 2022-09-10 PROCEDURE — 84703 CHORIONIC GONADOTROPIN ASSAY: CPT

## 2022-09-10 PROCEDURE — 85025 COMPLETE CBC W/AUTO DIFF WBC: CPT

## 2022-09-10 PROCEDURE — 99283 EMERGENCY DEPT VISIT LOW MDM: CPT

## 2022-09-10 PROCEDURE — 80053 COMPREHEN METABOLIC PANEL: CPT

## 2022-09-10 PROCEDURE — 81001 URINALYSIS AUTO W/SCOPE: CPT

## 2022-09-10 PROCEDURE — 84702 CHORIONIC GONADOTROPIN TEST: CPT

## 2022-09-10 PROCEDURE — 83690 ASSAY OF LIPASE: CPT

## 2022-09-10 PROCEDURE — 87077 CULTURE AEROBIC IDENTIFY: CPT

## 2022-09-10 PROCEDURE — 87086 URINE CULTURE/COLONY COUNT: CPT

## 2022-09-10 PROCEDURE — 6370000000 HC RX 637 (ALT 250 FOR IP): Performed by: PHYSICIAN ASSISTANT

## 2022-09-10 RX ORDER — DICYCLOMINE HYDROCHLORIDE 10 MG/1
20 CAPSULE ORAL
Qty: 30 CAPSULE | Refills: 0 | Status: SHIPPED | OUTPATIENT
Start: 2022-09-10

## 2022-09-10 RX ORDER — ONDANSETRON 4 MG/1
4 TABLET, ORALLY DISINTEGRATING ORAL ONCE
Status: COMPLETED | OUTPATIENT
Start: 2022-09-10 | End: 2022-09-10

## 2022-09-10 RX ORDER — ONDANSETRON 4 MG/1
4 TABLET, ORALLY DISINTEGRATING ORAL EVERY 8 HOURS PRN
Qty: 15 TABLET | Refills: 0 | Status: SHIPPED | OUTPATIENT
Start: 2022-09-10

## 2022-09-10 RX ORDER — DICYCLOMINE HCL 20 MG
20 TABLET ORAL ONCE
Status: COMPLETED | OUTPATIENT
Start: 2022-09-10 | End: 2022-09-10

## 2022-09-10 RX ADMIN — ONDANSETRON 4 MG: 4 TABLET, ORALLY DISINTEGRATING ORAL at 03:55

## 2022-09-10 RX ADMIN — DICYCLOMINE HYDROCHLORIDE 20 MG: 20 TABLET ORAL at 03:56

## 2022-09-10 NOTE — ED PROVIDER NOTES
EMERGENCY DEPARTMENT ENCOUNTER      PCP: No primary care provider on file. CHIEF COMPLAINT    Chief Complaint   Patient presents with    Abdominal Pain     Generalized, x2 days    Dizziness       This patient was not evaluated by the attending physician. I have independently evaluated this patient. HPI    Cruz Diaz is a 20 y.o. female who presents with lower abdominal pain. Onset 2 days ago. Patient has had associated nausea and lightheadedness. Patient has had associated pain with urination, denies vaginal bleeding or discharge. Patient is unsure if she is pregnant. Patient states she has irregular menstrual cycles, has not had normal menstrual cycle in 3 months. Patient has not taken any home pregnancy tests. Patient denies chest pain, shortness of breath, fever, vomiting. Patient states she had episode of diarrhea here otherwise states she is been having normal bowel movements. REVIEW OF SYSTEMS    Constitutional:  Denies fever  HENT:  Denies sore throat or ear pain   Cardiovascular:  Denies chest pain  Respiratory:  Denies cough or shortness of breath   GI:  See HPI above  : see HPI No vaginal symptoms. Musculoskeletal:  No pain or swelling of extremities. Skin:  Denies rash  Neurologic:  Denies focal weakness or sensory changes   Lymphatic:  Denies swollen glands     All other review of systems are negative  See HPI and nursing notes for additional information     PAST MEDICAL & SURGICAL HISTORY    Past Medical History:   Diagnosis Date    Asthma     Depression     Seasonal allergies      History reviewed. No pertinent surgical history.     CURRENT MEDICATIONS    Current Outpatient Rx   Medication Sig Dispense Refill    dicyclomine (BENTYL) 10 MG capsule Take 2 capsules by mouth 4 times daily (before meals and nightly) 30 capsule 0    ondansetron (ZOFRAN ODT) 4 MG disintegrating tablet Take 1 tablet by mouth every 8 hours as needed for Nausea or Vomiting 15 tablet 0    naproxen (NAPROSYN) 500 MG tablet Take 1 tablet by mouth 2 times daily 15 tablet 0    albuterol sulfate  (90 Base) MCG/ACT inhaler Inhale 2 puffs into the lungs every 6 hours as needed for Wheezing or Shortness of Breath (or cough) Please include spacer with instructions for use. 18 g 0    guaiFENesin (ROBITUSSIN) 100 MG/5ML liquid 1 teaspoon by mouth every 4-6 hours when necessary cough 120 mL 0    famotidine (PEPCID) 20 MG tablet Take 1 tablet by mouth 2 times daily 30 tablet 0    ARIPiprazole (ABILIFY) 15 MG tablet Take 15 mg by mouth daily         ALLERGIES    Allergies   Allergen Reactions    Seasonal        SOCIAL AND FAMILY HISTORY    Social History     Socioeconomic History    Marital status: Single     Spouse name: None    Number of children: None    Years of education: None    Highest education level: None   Tobacco Use    Smoking status: Former     Types: Cigarettes, E-Cigarettes    Smokeless tobacco: Never   Substance and Sexual Activity    Alcohol use: Yes     Comment: rare    Drug use: Yes     Frequency: 7.0 times per week     Types: Marijuana (Weed)    Sexual activity: Never     History reviewed. No pertinent family history. PHYSICAL EXAM    VITAL SIGNS: BP (!) 155/86   Pulse 67   Temp 98.3 °F (36.8 °C) (Oral)   Resp 16   SpO2 100%   Constitutional:  Well developed, well nourished  Eyes:  Sclera nonicteric, conjunctiva moist  HENT:  Atraumatic. PERRL. EOMI.  moist mucus membranes. Neck/Lymphatics: supple, no JVD, no swollen nodes  Respiratory:  No retractions, no accessory muscle use, normal breath sounds   Cardiovascular:   normal rate, normal rhythm  GI:     No gross discoloration. Bowel sounds present, no audible bruits. Soft,  no guarding,   no abdominal tenderness, no rebound, no palpable pulsatile masses  Back:   No CVA tenderness to percussion.   Musculoskeletal:  No edema, no deformity  Integument: No rash, dry skin  Neurologic:  Alert & oriented, normal speech  Psychiatric: Cooperative, pleasant affect       LABS:  Results for orders placed or performed during the hospital encounter of 09/10/22   Urinalysis   Result Value Ref Range    Color, UA YELLOW YELLOW    Clarity, UA CLEAR CLEAR    Glucose, Urine NEGATIVE NEGATIVE MG/DL    Bilirubin Urine NEGATIVE NEGATIVE MG/DL    Ketones, Urine NEGATIVE NEGATIVE MG/DL    Specific Gravity, UA <1.005 1.001 - 1.035    Blood, Urine NEGATIVE NEGATIVE    pH, Urine 7.0 5.0 - 8.0    Protein, UA NEGATIVE NEGATIVE MG/DL    Urobilinogen, Urine 0.2 0.2 - 1.0 MG/DL    Nitrite Urine, Quantitative NEGATIVE NEGATIVE    Leukocyte Esterase, Urine NEGATIVE NEGATIVE    RBC, UA NONE SEEN 0 - 6 /HPF    WBC, UA 2 0 - 5 /HPF    Bacteria, UA NEGATIVE NEGATIVE /HPF    Squam Epithel, UA <1 /HPF    Trichomonas, UA NONE SEEN NONE SEEN /HPF   CBC with Auto Differential   Result Value Ref Range    WBC 10.8 (H) 4.0 - 10.5 K/CU MM    RBC 4.74 4.2 - 5.4 M/CU MM    Hemoglobin 12.8 12.5 - 16.0 GM/DL    Hematocrit 40.8 37 - 47 %    MCV 86.1 78 - 100 FL    MCH 27.0 27 - 31 PG    MCHC 31.4 (L) 32.0 - 36.0 %    RDW 13.2 11.7 - 14.9 %    Platelets 983 967 - 421 K/CU MM    MPV 9.7 7.5 - 11.1 FL    Differential Type AUTOMATED DIFFERENTIAL     Segs Relative 73.8 (H) 36 - 66 %    Lymphocytes % 17.3 (L) 24 - 44 %    Monocytes % 6.0 (H) 0 - 4 %    Eosinophils % 2.2 0 - 3 %    Basophils % 0.5 0 - 1 %    Segs Absolute 7.9 K/CU MM    Lymphocytes Absolute 1.9 K/CU MM    Monocytes Absolute 0.6 K/CU MM    Eosinophils Absolute 0.2 K/CU MM    Basophils Absolute 0.1 K/CU MM    Nucleated RBC % 0.0 %    Total Nucleated RBC 0.0 K/CU MM    Total Immature Neutrophil 0.02 K/CU MM    Immature Neutrophil % 0.2 0 - 0.43 %   SPECIMEN REJECTION   Result Value Ref Range    Rejected Test CMPR     Reason for Rejection UNABLE TO PERFORM TESTING:    Comprehensive Metabolic Panel   Result Value Ref Range    Sodium 136 135 - 145 MMOL/L    Potassium 4.0 3.5 - 5.1 MMOL/L    Chloride 101 99 - 110 mMol/L    CO2 27 21 - 32 MMOL/L    BUN 7 6 - 23 MG/DL    Creatinine 0.7 0.6 - 1.1 MG/DL    Glucose 85 70 - 99 MG/DL    Calcium 9.7 8.3 - 10.6 MG/DL    Albumin 4.2 3.4 - 5.0 GM/DL    Total Protein 7.5 6.4 - 8.2 GM/DL    Total Bilirubin 0.2 0.0 - 1.0 MG/DL    ALT 20 10 - 40 U/L    AST 16 15 - 37 IU/L    Alkaline Phosphatase 121 40 - 128 IU/L    GFR Non-African American >60 >60 mL/min/1.73m2    GFR African American >60 >60 mL/min/1.73m2    Anion Gap 8 4 - 16   Lipase   Result Value Ref Range    Lipase 27 13 - 60 IU/L   HCG, Quantitative, Pregnancy   Result Value Ref Range    hCG Quant 0.5 UIU/ML             ED COURSE & MEDICAL DECISION MAKING      Patient presents as above. Abdomen is soft, nontender on exam.  No CVA tenderness. CBC shows slight elevation white blood cell count of 10.8. CMP is within normal limits. Lipase 27. Pregnancy negative. Urinalysis is unremarkable. I discussed labs with patient today. Patient states when she urinated in the emergency department she no longer had any pain with urination. Patient denies any vaginal symptoms, concern for STD. Patient declines pelvic exam.  I discussed unclear etiology of lower abdominal cramping pain, could related to something she had eaten, viral in nature or she may be getting ready to start her menstrual cycle. Abdomen is soft and nontender on exam and I do not believe she has ovarian torsion, appendicitis. No CVA tenderness I do not believe she has kidney stone or pyelonephritis. Patient provided prescription for Zofran and Bentyl. I recommend gradual increase in diet as tolerated. Recommend follow-up with primary care provider in 2 days for recheck and discussed the importance return to the emergency department immediately if new or worsening symptoms develop. Clinical  IMPRESSION    1. Lower abdominal pain    2.  Nausea        I discussed with patient the importance of return to the emergency department immediately if new or worsening symptoms develop. Comment: Please note this report has been produced using speech recognition software and may contain errors related to that system including errors in grammar, punctuation, and spelling, as well as words and phrases that may be inappropriate. If there are any questions or concerns please feel free to contact the dictating provider for clarification.        Brian Dolan PA-C  09/10/22 0826

## 2022-09-10 NOTE — ED NOTES
Patient ambulated independently to restroom. Urine specimen provided; sent to lab at this time.      Gregory Dexter RN  09/10/22 0110

## 2022-09-10 NOTE — ED NOTES
This RN escorted patient back to room ED-26, while in room patient informed this RN \"I think I might be pregnant, I haven't had a period in like three months. \" This RN informed patient of hcg lab testing; patient then stated, \"So with like all the laws and things right now, what would bright be some of my options for . \" This RN discussed with patient various options      Neris Owens RN  09/10/22 0220

## 2022-09-11 LAB
CULTURE: ABNORMAL
CULTURE: ABNORMAL
Lab: ABNORMAL
SPECIMEN: ABNORMAL

## 2022-09-20 ENCOUNTER — HOSPITAL ENCOUNTER (EMERGENCY)
Age: 22
Discharge: HOME | End: 2022-09-20
Attending: EMERGENCY MEDICINE
Payer: MEDICARE

## 2022-09-20 VITALS
OXYGEN SATURATION: 98 % | DIASTOLIC BLOOD PRESSURE: 87 MMHG | RESPIRATION RATE: 20 BRPM | HEART RATE: 73 BPM | SYSTOLIC BLOOD PRESSURE: 152 MMHG | TEMPERATURE: 97.5 F

## 2022-09-20 DIAGNOSIS — R22.0 LIP SWELLING: Primary | ICD-10-CM

## 2022-09-20 PROCEDURE — 99283 EMERGENCY DEPT VISIT LOW MDM: CPT

## 2022-09-20 PROCEDURE — 6370000000 HC RX 637 (ALT 250 FOR IP): Performed by: EMERGENCY MEDICINE

## 2022-09-20 RX ORDER — PREDNISONE 10 MG/1
40 TABLET ORAL DAILY
Qty: 16 TABLET | Refills: 0 | Status: SHIPPED | OUTPATIENT
Start: 2022-09-21 | End: 2022-09-25

## 2022-09-20 RX ORDER — FAMOTIDINE 20 MG/1
40 TABLET, FILM COATED ORAL ONCE
Status: COMPLETED | OUTPATIENT
Start: 2022-09-20 | End: 2022-09-20

## 2022-09-20 RX ORDER — DIPHENHYDRAMINE HCL 25 MG
50 TABLET ORAL ONCE
Status: COMPLETED | OUTPATIENT
Start: 2022-09-20 | End: 2022-09-20

## 2022-09-20 RX ORDER — PREDNISONE 20 MG/1
40 TABLET ORAL ONCE
Status: COMPLETED | OUTPATIENT
Start: 2022-09-20 | End: 2022-09-20

## 2022-09-20 RX ORDER — FAMOTIDINE 40 MG/1
40 TABLET, FILM COATED ORAL DAILY
Qty: 4 TABLET | Refills: 0 | Status: SHIPPED | OUTPATIENT
Start: 2022-09-21 | End: 2022-09-25

## 2022-09-20 RX ORDER — DIPHENHYDRAMINE HCL 25 MG
50 TABLET ORAL EVERY 8 HOURS
Qty: 30 TABLET | Refills: 0 | Status: SHIPPED | OUTPATIENT
Start: 2022-09-21 | End: 2022-09-26

## 2022-09-20 RX ADMIN — DIPHENHYDRAMINE HCL 50 MG: 25 TABLET ORAL at 15:33

## 2022-09-20 RX ADMIN — FAMOTIDINE 40 MG: 20 TABLET, FILM COATED ORAL at 15:34

## 2022-09-20 RX ADMIN — PREDNISONE 40 MG: 20 TABLET ORAL at 15:34

## 2022-09-20 ASSESSMENT — PAIN DESCRIPTION - LOCATION: LOCATION: MOUTH

## 2022-09-20 ASSESSMENT — PAIN DESCRIPTION - ORIENTATION: ORIENTATION: UPPER

## 2022-09-20 ASSESSMENT — PAIN SCALES - GENERAL: PAINLEVEL_OUTOF10: 10

## 2022-09-20 ASSESSMENT — PAIN - FUNCTIONAL ASSESSMENT: PAIN_FUNCTIONAL_ASSESSMENT: NONE - DENIES PAIN

## 2022-09-20 NOTE — ED NOTES
Discussed patient's prescriptions with patient. No further questions at this time. Instructed to follow up with PCP. Verbalized understanding, ambulatory at discharge.       Amara Ham RN  09/20/22 [id number]

## 2022-09-20 NOTE — ED PROVIDER NOTES
Triage Chief Complaint:   Oral Swelling (Upper lip swelling)    [place]:  Debra Hardin is a 20 y.o. female that presents with upper and lower lip swelling. Patient was baseline state of health until recently when she noticed some increased cracks and irritation to her lips. This morning her lips were much more swollen which concerned her and prompted ED visit. Patient reports this is happened 1 other time in the past and she does not know what causes it. Patient tried applying some Klever bees lip balm but that only seem to make things worse. Patient denies any tongue swelling. No difficulty breathing or swallowing. No wheezing or shortness of breath. No other symptoms at this time. No new medications or new foods. No new make-ups or lip products. There is no family history of any angioedema problems. Patient is not on any blood pressure medication specifically any lisinopril or ACE inhibitor's. ROS:  General:  No fevers, no chills  ENT: No sore throat, no runny nose, + lip swelling  Cardiovascular:  No chest pain, no palpitations  Respiratory:  No shortness of breath, no cough  Gastrointestinal:  No pain, no nausea, no vomiting, no diarrhea  : No pain with urinating, no increased frequency urinating  Neurologic:  No numbness, no weakness  Extremities:  No edema, no pain  Skin:  No rash  Psych: No axienty    Past Medical History:   Diagnosis Date    Asthma     Depression     Seasonal allergies      History reviewed. No pertinent surgical history. History reviewed. No pertinent family history. Social History     Socioeconomic History    Marital status: Single     Spouse name: Not on file    Number of children: Not on file    Years of education: Not on file    Highest education level: Not on file   Occupational History    Not on file   Tobacco Use    Smoking status: Former     Types: Cigarettes, E-Cigarettes    Smokeless tobacco: Never   Substance and Sexual Activity    Alcohol use:  Yes Comment: rare    Drug use: Yes     Frequency: 7.0 times per week     Types: Marijuana Candiss Littler)    Sexual activity: Never   Other Topics Concern    Not on file   Social History Narrative    Not on file     Social Determinants of Health     Financial Resource Strain: Not on file   Food Insecurity: Not on file   Transportation Needs: Not on file   Physical Activity: Not on file   Stress: Not on file   Social Connections: Not on file   Intimate Partner Violence: Not on file   Housing Stability: Not on file     No current facility-administered medications for this encounter. Current Outpatient Medications   Medication Sig Dispense Refill    [START ON 9/21/2022] predniSONE (DELTASONE) 10 MG tablet Take 4 tablets by mouth daily for 4 days 16 tablet 0    [START ON 9/21/2022] famotidine (PEPCID) 40 MG tablet Take 1 tablet by mouth daily for 4 days 4 tablet 0    [START ON 9/21/2022] diphenhydrAMINE (BENADRYL ALLERGY) 25 MG tablet Take 2 tablets by mouth every 8 (eight) hours for 5 days 30 tablet 0    dicyclomine (BENTYL) 10 MG capsule Take 2 capsules by mouth 4 times daily (before meals and nightly) 30 capsule 0    ondansetron (ZOFRAN ODT) 4 MG disintegrating tablet Take 1 tablet by mouth every 8 hours as needed for Nausea or Vomiting 15 tablet 0    naproxen (NAPROSYN) 500 MG tablet Take 1 tablet by mouth 2 times daily 15 tablet 0    albuterol sulfate  (90 Base) MCG/ACT inhaler Inhale 2 puffs into the lungs every 6 hours as needed for Wheezing or Shortness of Breath (or cough) Please include spacer with instructions for use.  18 g 0    guaiFENesin (ROBITUSSIN) 100 MG/5ML liquid 1 teaspoon by mouth every 4-6 hours when necessary cough 120 mL 0    ARIPiprazole (ABILIFY) 15 MG tablet Take 15 mg by mouth daily       Allergies   Allergen Reactions    Seasonal        Nursing Notes Reviewed    Physical Exam:  ED Triage Vitals [09/20/22 1412]   Enc Vitals Group      BP (!) 152/87      Heart Rate 73      Resp 20      Temp 97.5 °F (36.4 °C)      Temp src       SpO2 98 %      Weight       Height       Head Circumference       Peak Flow       Pain Score       Pain Loc       Pain Edu? Excl. in [address]? My pulse ox interpretation is - normal    General appearance:  No acute distress. Sitting comfortably in bed. Skin:  Warm. Dry. Eye:  Extraocular movements intact. Ears, nose, mouth and throat:  Oral mucosa moist.  There is upper and lower moderate lip edema with dry cracked lips present. There are some associated erythema about the lips as well. No lesions or sores. No glossal edema. No posterior oropharyngeal edema. Soft floor of mouth. Tolerating oral secretions. Protecting airway. No stridor. Neck: No meningismus or rigidity. No cervical lymphadenopathy. Extremity:  No swelling. Normal ROM     Heart:  Regular rate and rhythm, normal S1 & S2, no extra heart sounds. Abdomen:  Soft. Nontender. Nondistended. No rebound or guarding. Respiratory:  Lungs clear to auscultation bilaterally. Respirations nonlabored. Neurological:  Alert and oriented times 3. No focal neuro deficits. I have reviewed and interpreted all of the currently available lab results from this visit (if applicable):  No results found for this visit on 09/20/22. Radiographs (if obtained):  [] The following radiograph was interpreted by myself in the absence of a radiologist:   [] Radiologist's Report Reviewed:  No orders to display         EKG (if obtained): (All EKG's are interpreted by myself in the absence of a cardiologist)    Chart review shows recent radiographs:  No results found. MDM:  Pt presents as above. Emergent conditions considered. Presentation prompted initial history and physical.  Presentation consistent with mild angioedema versus allergic reaction. No evidence of anaphylaxis. Patient with no rapid progression of symptoms.   I will place patient on prednisone, Benadryl and Pepcid with first dose given in the emergency department. Recommended a bland lip emollient/balm for moisturization purposes. I did strongly encourage possible follow-up with primary care physician for [place] further allergy testing versus angioedema testing. Patient is agreeable with this plan. Discharged home with short burst of prednisone, Pepcid and Benadryl. I discussed specific signs and symptoms and when to return to the emergency department as well as need for close outpatient follow-up. Questions sought and answered with the patient. They voice understanding and agree with plan. Care of this patient did occur during the COVID-19 pandemic. Is this patient to be included in the SEP-1 Core Measure due to severe sepsis or septic shock? No   Exclusion criteria - the patient is NOT to be included for SEP-1 Core Measure due to: Infection is not suspected      Clinical Impression:  1. Lip swelling      Disposition referral (if applicable): [address] [place] [address]  [phone]  Schedule an appointment as soon as possible for a visit   [id number] [address] PRIMARY CARE PROVIDER (SEE BELOW)    [address] [place] [address] Emergency Department  [place] [id number] [phone]  Today  If symptoms worsen    Disposition medications (if applicable):  New Prescriptions    DIPHENHYDRAMINE (BENADRYL ALLERGY) 25 MG TABLET    Take 2 tablets by mouth every 8 (eight) hours for 5 days    FAMOTIDINE (PEPCID) 40 MG TABLET    Take 1 tablet by mouth daily for 4 days    PREDNISONE (DELTASONE) 10 MG TABLET    Take 4 tablets by mouth daily for 4 days       Comment: Please note this report has been produced using speech recognition software and may contain errors related to that system including errors in grammar, punctuation, and spelling, as well as words and phrases that may be inappropriate.  If there are any questions or concerns please feel free to contact the dictating provider for clarification.          Angelica Aragon MD  09/20/22 [id number]

## 2022-11-02 ENCOUNTER — HOSPITAL ENCOUNTER (OUTPATIENT)
Age: 22
Setting detail: SPECIMEN
Discharge: HOME | End: 2022-11-02
Payer: MEDICARE

## 2022-11-02 ENCOUNTER — INITIAL CONSULT (OUTPATIENT)
Dept: OBGYN | Age: 22
End: 2022-11-02
Payer: MEDICARE

## 2022-11-02 VITALS
BODY MASS INDEX: 44.41 KG/M2 | SYSTOLIC BLOOD PRESSURE: 132 MMHG | HEIGHT: 68 IN | DIASTOLIC BLOOD PRESSURE: 88 MMHG | WEIGHT: 293 LBS

## 2022-11-02 DIAGNOSIS — N92.6 IRREGULAR MENSES: ICD-10-CM

## 2022-11-02 DIAGNOSIS — E66.01 MORBID OBESITY (HCC): ICD-10-CM

## 2022-11-02 DIAGNOSIS — Z01.419 WOMEN'S ANNUAL ROUTINE GYNECOLOGICAL EXAMINATION: Primary | ICD-10-CM

## 2022-11-02 PROCEDURE — G8484 FLU IMMUNIZE NO ADMIN: HCPCS

## 2022-11-02 PROCEDURE — 99385 PREV VISIT NEW AGE 18-39: CPT

## 2022-11-02 PROCEDURE — 87801 DETECT AGNT MULT DNA AMPLI: CPT

## 2022-11-02 PROCEDURE — 36415 COLL VENOUS BLD VENIPUNCTURE: CPT

## 2022-11-02 PROCEDURE — 88142 CYTOPATH C/V THIN LAYER: CPT

## 2022-11-02 SDOH — ECONOMIC STABILITY: FOOD INSECURITY: WITHIN THE PAST 12 MONTHS, THE FOOD YOU BOUGHT JUST DIDN'T LAST AND YOU DIDN'T HAVE MONEY TO GET MORE.: SOMETIMES TRUE

## 2022-11-02 SDOH — ECONOMIC STABILITY: FOOD INSECURITY: WITHIN THE PAST 12 MONTHS, YOU WORRIED THAT YOUR FOOD WOULD RUN OUT BEFORE YOU GOT MONEY TO BUY MORE.: SOMETIMES TRUE

## 2022-11-02 ASSESSMENT — PATIENT HEALTH QUESTIONNAIRE - PHQ9
SUM OF ALL RESPONSES TO PHQ9 QUESTIONS 1 & 2: 0
1. LITTLE INTEREST OR PLEASURE IN DOING THINGS: 0
SUM OF ALL RESPONSES TO PHQ QUESTIONS 1-9: 0
SUM OF ALL RESPONSES TO PHQ QUESTIONS 1-9: 0
2. FEELING DOWN, DEPRESSED OR HOPELESS: 0
SUM OF ALL RESPONSES TO PHQ QUESTIONS 1-9: 0
SUM OF ALL RESPONSES TO PHQ QUESTIONS 1-9: 0

## 2022-11-02 ASSESSMENT — ENCOUNTER SYMPTOMS
RESPIRATORY NEGATIVE: 1
GASTROINTESTINAL NEGATIVE: 1
ABDOMINAL PAIN: 0

## 2022-11-02 ASSESSMENT — SOCIAL DETERMINANTS OF HEALTH (SDOH): HOW HARD IS IT FOR YOU TO PAY FOR THE VERY BASICS LIKE FOOD, HOUSING, MEDICAL CARE, AND HEATING?: NOT HARD AT ALL

## 2022-11-02 NOTE — PROGRESS NOTES
11/2/22    Yael Torres  2000    Chief Complaint   Patient presents with    Consultation     Pt referred by [facility] Irregular menses, pt states she will have a period and then 2-3 months until the next cycle. No B.C. pt is wishing to discuss bc options. Pt is sexually active. Never had pap smear. The patient is a 20 y.o. female, No obstetric history on file. who presents for her annual exam.  She is menstruating abnormally. She is  sexually active. She is not currently taking birth control    She reports additional symptoms of irregular menses x yrs. . Pt states she typically goes months at a time without menses, has had about 4 cycle so far this year. She is not particularly interested in being on birth control, states she would like to not have cycles at all if possible. Pap smear history: She has not had a PAP smear in the past.    Past Medical History:   Diagnosis Date    Asthma     Depression     Irregular menses     Seasonal allergies        No past surgical history on file.     Family History   Problem Relation Age of Onset    Mental Illness Mother     Cancer Mother        Social History     Tobacco Use    Smoking status: Former     Types: Cigarettes, E-Cigarettes    Smokeless tobacco: Never   Vaping Use    Vaping Use: Every day    Substances: Nicotine   Substance Use Topics    Alcohol use: Yes     Comment: rare    Drug use: Yes     Frequency: 7.0 times per week     Types: Marijuana Shawn Herron)       Current Outpatient Medications   Medication Sig Dispense Refill    famotidine (PEPCID) 40 MG tablet Take 1 tablet by mouth daily for 4 days 4 tablet 0    dicyclomine (BENTYL) 10 MG capsule Take 2 capsules by mouth 4 times daily (before meals and nightly) (Patient not taking: Reported on 11/2/2022) 30 capsule 0    ondansetron (ZOFRAN ODT) 4 MG disintegrating tablet Take 1 tablet by mouth every 8 hours as needed for Nausea or Vomiting (Patient not taking: Reported on 11/2/2022) 15 tablet 0 naproxen (NAPROSYN) 500 MG tablet Take 1 tablet by mouth 2 times daily (Patient not taking: Reported on 11/2/2022) 15 tablet 0    albuterol sulfate  (90 Base) MCG/ACT inhaler Inhale 2 puffs into the lungs every 6 hours as needed for Wheezing or Shortness of Breath (or cough) Please include spacer with instructions for use. 18 g 0    guaiFENesin (ROBITUSSIN) 100 MG/5ML liquid 1 teaspoon by mouth every 4-6 hours when necessary cough (Patient not taking: Reported on 11/2/2022) 120 mL 0    ARIPiprazole (ABILIFY) 15 MG tablet Take 15 mg by mouth daily (Patient not taking: Reported on 11/2/2022)       No current facility-administered medications for this visit. Allergies   Allergen Reactions    Seasonal        No obstetric history on file. Immunization History   Administered Date(s) Administered    Tdap (Boostrix, Adacel) 12/14/2018       Review of Systems   Constitutional: Negative. Negative for fatigue and fever. Respiratory: Negative. Gastrointestinal: Negative. Negative for abdominal pain. Endocrine: Negative. Genitourinary:  Positive for menstrual problem. Negative for dysuria, frequency, pelvic pain, vaginal discharge and vaginal pain. Musculoskeletal: Negative. Skin: Negative. Neurological: Negative. Negative for dizziness and headaches. Psychiatric/Behavioral: Negative. /88   Ht 5' 8\" (1.727 m)   Wt (!) 316 lb (143.3 kg)   BMI 48.05 kg/m²     Physical Exam  Vitals and nursing note reviewed. Constitutional:       General: She is not in acute distress. Appearance: Normal appearance. She is obese. HENT:      Head: Normocephalic and atraumatic. Pulmonary:      Effort: Pulmonary effort is normal. No respiratory distress. Chest:   Breasts:     Right: Normal.      Left: Normal.   Abdominal:      Palpations: Abdomen is soft. Tenderness: There is no abdominal tenderness. Genitourinary:     General: Normal vulva.       Exam position: Lithotomy position. Vagina: Normal.      Cervix: Normal.      Uterus: Normal.       Adnexa: Right adnexa normal and left adnexa normal.   Musculoskeletal:         General: Normal range of motion. Cervical back: Normal range of motion. Lymphadenopathy:      Upper Body:      Right upper body: No supraclavicular or axillary adenopathy. Left upper body: No supraclavicular or axillary adenopathy. Skin:     General: Skin is warm and dry. Findings: No rash. Neurological:      General: No focal deficit present. Mental Status: She is alert and oriented to person, place, and time. Psychiatric:         Mood and Affect: Mood normal.         Behavior: Behavior normal.         Thought Content: Thought content normal.       No results found for this visit on 11/02/22. Assessment and Plan   Diagnosis Orders   1. Women's annual routine gynecological examination  PAP SMEAR    C.trachomatis N.gonorrhoeae DNA, Thin Prep      2. Irregular menses  US NON OB TRANSVAGINAL    DHEA-Sulfate    Insulin, total    Prolactin    TSH with Reflex    Sex Hormone Binding Globulin    Testosterone, free, total    Follicle Stimulating Hormone    Luteinizing Hormone      3. Morbid obesity ([address]          Pap, g/c pap, PCOS panel, u/s next week or two with physician follow-up to discuss menses management options, possible ablation. Reviewed basics of ablation, also discussed possibility of PCOS and typical diagnostic criteria. All questions/concerns addressed    Return in about 1 week (around 11/9/2022) for u/s & physician follow-up.     Rafal Barron PA-C

## 2022-11-03 ENCOUNTER — TELEPHONE (OUTPATIENT)
Dept: OBGYN | Age: 22
End: 2022-11-03

## 2022-11-03 LAB
FOLLICLE STIMULATING HORMONE: 9.1 MIU/ML
LUTEINIZING HORMONE: 4.7 MIU/ML
PROLACTIN: 12.3 NG/ML
TSH REFLEX: 3.85 UIU/ML (ref 0.27–4.2)

## 2022-11-03 NOTE — TELEPHONE ENCOUNTER
Still waiting on all results to come back, but everything so far has been within normal range (no thyroid abnormality, other abnormal hormone concentrations).  Will be sure to contact with any abnormal result as they come back

## 2022-11-03 NOTE — TELEPHONE ENCOUNTER
Pt seen results through [employer], pt is wanting know results and what the numbers on the results mean ?

## 2022-11-04 LAB
INSULIN COMMENT: NORMAL
INSULIN REFERENCE RANGE:: NORMAL
INSULIN: 27.9 MU/L
SEX HORMONE BINDING GLOBULIN: 36 NMOL/L (ref 30–135)
TESTOSTERONE FREE-NONMALE: 8.4 PG/ML (ref 0.8–7.4)
TESTOSTERONE TOTAL: 49 NG/DL (ref 20–70)

## 2022-11-05 LAB
CHLAMYDIA BY THIN PREP: NEGATIVE
GC BY THIN PREP: NEGATIVE

## 2022-11-07 LAB — DHEAS (DHEA SULFATE): 240 UG/DL (ref 65–380)

## 2022-12-01 ENCOUNTER — HOSPITAL ENCOUNTER (EMERGENCY)
Age: 22
Discharge: LEFT BEFORE DISCHARGE | End: 2022-12-01
Attending: EMERGENCY MEDICINE
Payer: MEDICARE

## 2022-12-01 VITALS
WEIGHT: 293 LBS | DIASTOLIC BLOOD PRESSURE: 65 MMHG | BODY MASS INDEX: 44.41 KG/M2 | RESPIRATION RATE: 16 BRPM | TEMPERATURE: 98.4 F | HEIGHT: 68 IN | OXYGEN SATURATION: 100 % | HEART RATE: 88 BPM | SYSTOLIC BLOOD PRESSURE: 115 MMHG

## 2022-12-01 PROCEDURE — 93005 ELECTROCARDIOGRAM TRACING: CPT | Performed by: EMERGENCY MEDICINE

## 2022-12-01 PROCEDURE — 4500000002 HC ER NO CHARGE

## 2022-12-01 NOTE — ED PROVIDER NOTES
I did not evaluate this patient, I had gone out to the room to see her, and was informed by nursing that she had gone to the bathroom and someone else had open the door on her she became very upset, apparently the lock is broken, she stated the nurse was rude and she left.   She has not returned, I did not evaluate her     Oseas Holcomb MD  12/01/22 [id number]

## 2022-12-02 LAB
EKG ATRIAL RATE: 85 BPM
EKG DIAGNOSIS: NORMAL
EKG P AXIS: 63 DEGREES
EKG P-R INTERVAL: 184 MS
EKG Q-T INTERVAL: 354 MS
EKG QRS DURATION: 90 MS
EKG QTC CALCULATION (BAZETT): 421 MS
EKG R AXIS: 53 DEGREES
EKG T AXIS: 26 DEGREES
EKG VENTRICULAR RATE: 85 BPM

## 2022-12-02 PROCEDURE — 93010 ELECTROCARDIOGRAM REPORT: CPT | Performed by: INTERNAL MEDICINE

## 2023-04-12 NOTE — ED NOTES
Patient refused all IM medication at this time. Eugenie Boeck, [address] notified.       Milly Mas RN  08/25/21 7405
10

## 2023-07-25 ENCOUNTER — HOSPITAL ENCOUNTER (EMERGENCY)
Age: 23
Discharge: HOME | End: 2023-07-25
Attending: EMERGENCY MEDICINE
Payer: MEDICAID

## 2023-07-25 VITALS
DIASTOLIC BLOOD PRESSURE: 87 MMHG | RESPIRATION RATE: 19 BRPM | OXYGEN SATURATION: 100 % | BODY MASS INDEX: 43.4 KG/M2 | HEART RATE: 84 BPM | WEIGHT: 293 LBS | HEIGHT: 69 IN | TEMPERATURE: 98.4 F | SYSTOLIC BLOOD PRESSURE: 143 MMHG

## 2023-07-25 DIAGNOSIS — Z20.2 POSSIBLE EXPOSURE TO STD: ICD-10-CM

## 2023-07-25 DIAGNOSIS — N92.6 IRREGULAR MENSES: ICD-10-CM

## 2023-07-25 DIAGNOSIS — R42 LIGHTHEADEDNESS: Primary | ICD-10-CM

## 2023-07-25 DIAGNOSIS — R51.9 NONINTRACTABLE HEADACHE, UNSPECIFIED CHRONICITY PATTERN, UNSPECIFIED HEADACHE TYPE: ICD-10-CM

## 2023-07-25 LAB
BACTERIA: NEGATIVE /HPF
BILIRUBIN URINE: NEGATIVE MG/DL
BLOOD, URINE: ABNORMAL
CHP ED QC CHECK: YES
CLARITY: CLEAR
COLOR: YELLOW
EKG ATRIAL RATE: 76 BPM
EKG DIAGNOSIS: NORMAL
EKG P AXIS: 52 DEGREES
EKG P-R INTERVAL: 198 MS
EKG Q-T INTERVAL: 356 MS
EKG QRS DURATION: 92 MS
EKG QTC CALCULATION (BAZETT): 400 MS
EKG R AXIS: 40 DEGREES
EKG T AXIS: 12 DEGREES
EKG VENTRICULAR RATE: 76 BPM
GLUCOSE BLD-MCNC: 100 MG/DL
GLUCOSE BLD-MCNC: 100 MG/DL (ref 70–99)
GLUCOSE, URINE: NEGATIVE MG/DL
KETONES, URINE: NEGATIVE MG/DL
LEUKOCYTE ESTERASE, URINE: NEGATIVE
Lab: NORMAL
NITRITE URINE, QUANTITATIVE: NEGATIVE
PH, URINE: 8 (ref 5–8)
PROTEIN UA: NEGATIVE MG/DL
RBC URINE: <1 /HPF (ref 0–6)
SPECIFIC GRAVITY UA: 1.01 (ref 1–1.03)
SPECIMEN: NORMAL
SQUAMOUS EPITHELIAL: <1 /HPF
TRICHOMONAS: ABNORMAL /HPF
UROBILINOGEN, URINE: 0.2 MG/DL (ref 0.2–1)
WBC UA: <0 /HPF (ref 0–5)
WET PREP: NORMAL

## 2023-07-25 PROCEDURE — 87210 SMEAR WET MOUNT SALINE/INK: CPT

## 2023-07-25 PROCEDURE — 87491 CHLMYD TRACH DNA AMP PROBE: CPT

## 2023-07-25 PROCEDURE — 81001 URINALYSIS AUTO W/SCOPE: CPT

## 2023-07-25 PROCEDURE — 6360000002 HC RX W HCPCS: Performed by: EMERGENCY MEDICINE

## 2023-07-25 PROCEDURE — 82962 GLUCOSE BLOOD TEST: CPT

## 2023-07-25 PROCEDURE — 99284 EMERGENCY DEPT VISIT MOD MDM: CPT

## 2023-07-25 PROCEDURE — 87591 N.GONORRHOEAE DNA AMP PROB: CPT

## 2023-07-25 PROCEDURE — 96372 THER/PROPH/DIAG INJ SC/IM: CPT

## 2023-07-25 PROCEDURE — 93010 ELECTROCARDIOGRAM REPORT: CPT | Performed by: INTERNAL MEDICINE

## 2023-07-25 PROCEDURE — 93005 ELECTROCARDIOGRAM TRACING: CPT | Performed by: EMERGENCY MEDICINE

## 2023-07-25 RX ORDER — KETOROLAC TROMETHAMINE 15 MG/ML
15 INJECTION, SOLUTION INTRAMUSCULAR; INTRAVENOUS ONCE
Status: COMPLETED | OUTPATIENT
Start: 2023-07-25 | End: 2023-07-25

## 2023-07-25 RX ADMIN — KETOROLAC TROMETHAMINE 15 MG: 15 INJECTION, SOLUTION INTRAMUSCULAR; INTRAVENOUS at 06:38

## 2023-07-25 ASSESSMENT — PAIN DESCRIPTION - LOCATION: LOCATION: NECK

## 2023-07-25 ASSESSMENT — PAIN SCALES - GENERAL: PAINLEVEL_OUTOF10: 3

## 2023-07-25 ASSESSMENT — PAIN DESCRIPTION - ORIENTATION: ORIENTATION: LEFT;RIGHT

## 2023-07-25 ASSESSMENT — PAIN DESCRIPTION - DESCRIPTORS: DESCRIPTORS: ACHING

## 2023-07-25 NOTE — ED PROVIDER NOTES
Emergency Department Encounter    Patient: Joan Siddiqui  MRN: [id number]  : 2000  Date of Evaluation: 2023  ED Provider:  Moisés Stapleton MD    Triage Chief Complaint:   Neck Pain and Dizziness    [place]:  Joan Siddiqui is a 24 y.o. female that presents with complaint of headache, that radiates down into her neck. She reports she felt lightheaded, like her vision would go black, and had body aches and had a headache. It started while she was walking down the steps at her friend's house. The lightheaded episode happened first, headache has gradually come on. States she gets headaches occasionally, feels similar, no vomiting. It is like a band. She typically works night shift, should be at work right now. Usually drinks caffeine, a lot between 2 AM and 6 AM.  Otherwise not really through the day. She does this when she is working. She has irregular menses, had her cycle about a month ago, and then again this week. She denies concern for pregnancy, is sexually active only with other women. She has not had any vomiting. Reports chronic diarrhea, no change to this. No chest pain or shortness of breath. No acute cough or fevers. No blurred vision, no flashing lights. She was still having headache and so came in to be seen. It was not a sudden onset headache. She has had lightheadedness episodes like this before, reports over the last 2 months they had increased in frequency. Had not seen her doctor. She has an upcoming visit with her doctor for her yearly physical.  She had not seen him since last year, she sees [facility]. Elias [place] at the [facility]. No trauma or injury. Reports since about a year ago she will occasionally hear a whooshing in her ear, that is not occurring today. ROS - see HPI, below listed is current ROS at time of my eval:  10 systems reviewed and negative except as above.      Past Medical History:   Diagnosis Date    Asthma     Depression     Irregular menses

## 2023-07-25 NOTE — ED NOTES
Discharge instructions given. Pt verbalized understanding. Pt ambulated to waiting room.         Mauricio Ramos RN  07/25/23 2670

## 2023-07-25 NOTE — ED TRIAGE NOTES
Patient stated she has been getting lightheaded for the past month. Patient stated she came in because her neck started hurting when she had an episode of lightheadedness.

## 2023-07-27 LAB
C TRACH RRNA SPEC QL NAA+PROBE: NEGATIVE
N GONORRHOEA RRNA SPEC QL NAA+PROBE: NEGATIVE

## 2023-08-29 ENCOUNTER — HOSPITAL ENCOUNTER (EMERGENCY)
Age: 23
Discharge: HOME | End: 2023-08-29
Attending: EMERGENCY MEDICINE
Payer: MEDICAID

## 2023-08-29 VITALS
HEIGHT: 68 IN | HEART RATE: 99 BPM | SYSTOLIC BLOOD PRESSURE: 143 MMHG | RESPIRATION RATE: 20 BRPM | DIASTOLIC BLOOD PRESSURE: 74 MMHG | WEIGHT: 293 LBS | OXYGEN SATURATION: 98 % | BODY MASS INDEX: 44.41 KG/M2 | TEMPERATURE: 98.8 F

## 2023-08-29 DIAGNOSIS — M54.9 BACK PAIN, UNSPECIFIED BACK LOCATION, UNSPECIFIED BACK PAIN LATERALITY, UNSPECIFIED CHRONICITY: Primary | ICD-10-CM

## 2023-08-29 PROCEDURE — 6370000000 HC RX 637 (ALT 250 FOR IP): Performed by: EMERGENCY MEDICINE

## 2023-08-29 PROCEDURE — 6360000002 HC RX W HCPCS: Performed by: EMERGENCY MEDICINE

## 2023-08-29 PROCEDURE — 99284 EMERGENCY DEPT VISIT MOD MDM: CPT

## 2023-08-29 PROCEDURE — 96372 THER/PROPH/DIAG INJ SC/IM: CPT

## 2023-08-29 RX ORDER — METHOCARBAMOL 500 MG/1
750 TABLET, FILM COATED ORAL ONCE
Status: COMPLETED | OUTPATIENT
Start: 2023-08-29 | End: 2023-08-29

## 2023-08-29 RX ORDER — LIDOCAINE 4 G/G
1 PATCH TOPICAL ONCE
Status: DISCONTINUED | OUTPATIENT
Start: 2023-08-29 | End: 2023-08-30 | Stop reason: HOSPADM

## 2023-08-29 RX ORDER — LIDOCAINE 50 MG/G
1 PATCH TOPICAL DAILY
Qty: 10 PATCH | Refills: 0 | Status: SHIPPED | OUTPATIENT
Start: 2023-08-29 | End: 2023-09-08

## 2023-08-29 RX ORDER — KETOROLAC TROMETHAMINE 15 MG/ML
30 INJECTION, SOLUTION INTRAMUSCULAR; INTRAVENOUS ONCE
Status: COMPLETED | OUTPATIENT
Start: 2023-08-29 | End: 2023-08-29

## 2023-08-29 RX ORDER — METHOCARBAMOL 500 MG/1
500 TABLET, FILM COATED ORAL 4 TIMES DAILY PRN
Qty: 40 TABLET | Refills: 0 | Status: SHIPPED | OUTPATIENT
Start: 2023-08-29 | End: 2023-09-08

## 2023-08-29 RX ADMIN — KETOROLAC TROMETHAMINE 30 MG: 15 INJECTION, SOLUTION INTRAMUSCULAR; INTRAVENOUS at 22:46

## 2023-08-29 RX ADMIN — METHOCARBAMOL 750 MG: 500 TABLET ORAL at 22:45

## 2023-08-29 ASSESSMENT — ENCOUNTER SYMPTOMS: BACK PAIN: 1

## 2023-08-29 ASSESSMENT — PAIN SCALES - GENERAL
PAINLEVEL_OUTOF10: 7
PAINLEVEL_OUTOF10: 7

## 2023-08-29 ASSESSMENT — PAIN DESCRIPTION - ORIENTATION: ORIENTATION: RIGHT

## 2023-08-29 ASSESSMENT — PAIN DESCRIPTION - LOCATION: LOCATION: BACK

## 2023-08-30 NOTE — ED PROVIDER NOTES
Female presents with low back pain exacerbation. She endorses having some low back pain over the past couple days. Patient had been stretching every day and overdid it. Denies any fall or injury. No saddle prestigious pain or bowel urinary incontinence pain fevers or chills but no IV drug use pain, flank symptoms. She reports that she has had back pain before. She has tried Tylenol and ibuprofen with incomplete relief of symptoms. She can ambulate without difficulty. Denies any other remarkable symptoms. She states she has missed the past couple days of work due to her pain symptoms. She presents afebrile. Tach saturations in the high 90s on room air. Chest reproducible paraspinal low back tenderness. No midline step-off or deformity. She is amatory without difficulty. Resist gravity with all extremities. Overall etiology seems consistent with a musculoskeletal exacerbation of back pain. Her pain is atraumatic. Imaging is not warranted. She has been using anti-inflammatories at home with some relief of her symptoms. She be treated with IM ketorolac, oral Robaxin as well as topical call Lidoderm patch. She will continue similar treatment at home. She reports she is not currently linked with primary care. She begin referral to primary care. Her care is complicated by lack of access to primary care. She is appropriate for outpatient management. She been prescribed oral Robaxin, topical Lidoderm as well as topical Voltaren gel for home. She will follow-up outpatient. We did discuss that long-term she may require referral to a spine specialist if she continues to be having similar flares. She is ambulatory without difficulty. She is discharged home in stable condition, with strict return precautions. -  Patient seen and evaluated in the emergency department. -  Triage and nursing notes reviewed and incorporated. -  Old chart records reviewed and incorporated.   -

## 2023-12-31 ENCOUNTER — HOSPITAL ENCOUNTER (EMERGENCY)
Age: 23
Discharge: HOME | End: 2023-12-31
Payer: MEDICAID

## 2023-12-31 VITALS
SYSTOLIC BLOOD PRESSURE: 162 MMHG | HEART RATE: 100 BPM | RESPIRATION RATE: 17 BRPM | OXYGEN SATURATION: 96 % | TEMPERATURE: 98.9 F | DIASTOLIC BLOOD PRESSURE: 85 MMHG

## 2023-12-31 DIAGNOSIS — R09.89 SYMPTOMS OF UPPER RESPIRATORY INFECTION (URI): ICD-10-CM

## 2023-12-31 DIAGNOSIS — J02.9 PHARYNGITIS, UNSPECIFIED ETIOLOGY: Primary | ICD-10-CM

## 2023-12-31 LAB
HETEROPHILE ANTIBODIES: NEGATIVE
SARS-COV-2 RDRP RESP QL NAA+PROBE: NOT DETECTED
SOURCE: NORMAL

## 2023-12-31 PROCEDURE — 99283 EMERGENCY DEPT VISIT LOW MDM: CPT

## 2023-12-31 PROCEDURE — 87081 CULTURE SCREEN ONLY: CPT

## 2023-12-31 PROCEDURE — 6360000002 HC RX W HCPCS: Performed by: PHYSICIAN ASSISTANT

## 2023-12-31 PROCEDURE — 87491 CHLMYD TRACH DNA AMP PROBE: CPT

## 2023-12-31 PROCEDURE — 87591 N.GONORRHOEAE DNA AMP PROB: CPT

## 2023-12-31 PROCEDURE — 87635 SARS-COV-2 COVID-19 AMP PRB: CPT

## 2023-12-31 PROCEDURE — 87430 STREP A AG IA: CPT

## 2023-12-31 PROCEDURE — 86318 IA INFECTIOUS AGENT ANTIBODY: CPT

## 2023-12-31 RX ORDER — DEXAMETHASONE SODIUM PHOSPHATE 10 MG/ML
10 INJECTION, SOLUTION INTRAMUSCULAR; INTRAVENOUS ONCE
Status: COMPLETED | OUTPATIENT
Start: 2023-12-31 | End: 2023-12-31

## 2023-12-31 RX ORDER — DEXAMETHASONE 2 MG/1
6 TABLET ORAL ONCE AS NEEDED
Qty: 3 TABLET | Refills: 0 | Status: SHIPPED | OUTPATIENT
Start: 2024-01-02

## 2023-12-31 RX ADMIN — DEXAMETHASONE SODIUM PHOSPHATE 10 MG: 10 INJECTION INTRAMUSCULAR; INTRAVENOUS at 13:27

## 2023-12-31 NOTE — DISCHARGE INSTRUCTIONS
Please call your primary care provider to schedule an appointment for reevaluation within 7 to 10 days and to discuss your visit to the emergency department.  If you do not have a primary care provider, you can contact the primary care referral line, or the provider listed in your after visit summary paperwork provided to you today.    Follow-up with a primary care provider to discuss the results of your cultures, and your blood work results.    Return to emergency department if you develop any difficulty breathing, difficulty swallowing;  neck swelling or painful large lumps in your neck, or if sore throat worsens and does not improve or worsens after 3 days; any uncontrollable vomiting or if you are unable  to keep fluids down. Return with any  weakness, chest pain, confusion, passing out, worsening symptoms or any new concerns.  To help with your symptoms, you can use the following:    For congestion, you could use saline nasal sprays, irrigation or rinses.  Adults may find relief from over-the-counter decongestants.    For sore throat you could use over-the-counter pain reliever such as iibuprofen and/or acetaminophen (Tylenol).*  Gargle with warm salt water, or use any over-the-counter throat sprays.    For fevers, chills, muscle aches, or any headaches, you can use over-the-counter pain relievers such as ibuprofen and/or acetaminophen (Tylenol).*    I would recommend 400 to 600 mg ibuprofen every 6 hours not to exceed 3200 mg in 24 hours    650 to 1000 mg every every 4-6 hours of Tylenol, not to exceed a total of 4000 mg in a 24-hour period    For cough, you could use vaporizers, humidifiers, warm baths/showers.    Stay hydrated, get plenty of rest, eat small frequent nutritious meals and snacks.  Hot liquids (tea, broth, soup) if age-appropriate may also help with your symptoms.    Practice good hand hygiene; avoid touching your mouth nose and eyes.  Wear a mask.    *(Over-the-counter pain medication such as

## 2023-12-31 NOTE — ED PROVIDER NOTES
**ADVANCED PRACTICE PROVIDER, I HAVE EVALUATED THIS PATIENT**        [facility] EMERGENCY DEPARTMENT  EMERGENCY DEPARTMENT ENCOUNTER      Pt Name: Parker Vizcaino  MRN:[id number]  Birthdate 2000  Date of evaluation: 12/31/2023  Provider: Markie Edge PA-C      Chief Complaint:    Chief Complaint   Patient presents with    Pharyngitis     For the last few days    Cough         Nursing Notes, Past Medical Hx, Past Surgical Hx, Social Hx, Allergies, and Family Hx were all reviewed and agreed with or any disagreements were addressed in the HPI.    HISTORY OF PRESENT ILLNESS     History from : Patient    Limitations to history : None    Parker Vizcaino is a 23 y.o. female smoker who presents with c/o  sore throat x 3days and cough x1 day, productive green phlegm.  Gargled peroxide, and salt water with no improvement.  She mentions her voice sounds different, describing that it is worse.  She mentions she is getting some mild intermittent headaches.  She was seen at a hospital in [place], was told that she had a negative strep, but throat pain is worsening.  Mentions h/o of asthma, using albuterol as needed.  Denies: cough worse at night, wheezing, chills,  myalgias, drooling, muffled voice    PastMedical/Surgical History:      Diagnosis Date    Asthma     Depression     Irregular menses     Seasonal allergies      History reviewed. No pertinent surgical history.    Medications:  Discharge Medication List as of 12/31/2023  1:29 PM        CONTINUE these medications which have NOT CHANGED    Details   diclofenac sodium (VOLTAREN) 1 % GEL Apply 2 g topically 4 times daily for 6 days, Topical, 4 TIMES DAILY Starting Tue 8/29/2023, Until Mon 9/4/2023, For 6 days, Disp-50 g, R-0, Normal      famotidine (PEPCID) 40 MG tablet Take 1 tablet by mouth daily for 4 days, Disp-4 tablet, R-0Normal      dicyclomine (BENTYL) 10 MG capsule Take 2 capsules by mouth 4 times daily (before meals

## 2024-01-01 ASSESSMENT — ENCOUNTER SYMPTOMS
WHEEZING: 0
SHORTNESS OF BREATH: 0
ABDOMINAL PAIN: 0
FACIAL SWELLING: 0
COUGH: 1
TROUBLE SWALLOWING: 1
SORE THROAT: 1
VOICE CHANGE: 1

## 2024-01-03 LAB
CULTURE: NORMAL
Lab: NORMAL
SPECIMEN: NORMAL
STREP A DIRECT SCREEN: NEGATIVE

## 2024-01-04 LAB
C TRACH RRNA SPEC QL NAA+PROBE: NEGATIVE
N GONORRHOEA RRNA SPEC QL NAA+PROBE: NEGATIVE

## 2024-04-04 ENCOUNTER — HOSPITAL ENCOUNTER (OUTPATIENT)
Age: 24
Setting detail: SPECIMEN
Discharge: HOME | End: 2024-04-04
Payer: MEDICAID

## 2024-04-04 ENCOUNTER — INITIAL CONSULT (OUTPATIENT)
Dept: OBGYN | Age: 24
End: 2024-04-04
Payer: MEDICAID

## 2024-04-04 VITALS
DIASTOLIC BLOOD PRESSURE: 99 MMHG | SYSTOLIC BLOOD PRESSURE: 152 MMHG | WEIGHT: 293 LBS | HEIGHT: 68 IN | BODY MASS INDEX: 44.41 KG/M2

## 2024-04-04 DIAGNOSIS — N91.2 AMENORRHEA: ICD-10-CM

## 2024-04-04 DIAGNOSIS — E66.01 MORBID OBESITY (HCC): ICD-10-CM

## 2024-04-04 DIAGNOSIS — L68.0 HIRSUTISM: ICD-10-CM

## 2024-04-04 DIAGNOSIS — L73.2 HIDRADENITIS SUPPURATIVA: ICD-10-CM

## 2024-04-04 DIAGNOSIS — Z01.419 WOMEN'S ANNUAL ROUTINE GYNECOLOGICAL EXAMINATION: Primary | ICD-10-CM

## 2024-04-04 DIAGNOSIS — Z80.41 FAMILY HISTORY OF OVARIAN CANCER: ICD-10-CM

## 2024-04-04 DIAGNOSIS — Z11.3 SCREEN FOR SEXUALLY TRANSMITTED DISEASES: ICD-10-CM

## 2024-04-04 LAB
CONTROL: NORMAL
PREGNANCY TEST URINE, POC: NORMAL

## 2024-04-04 PROCEDURE — 99385 PREV VISIT NEW AGE 18-39: CPT | Performed by: NURSE PRACTITIONER

## 2024-04-04 PROCEDURE — 36415 COLL VENOUS BLD VENIPUNCTURE: CPT | Performed by: NURSE PRACTITIONER

## 2024-04-04 PROCEDURE — 88142 CYTOPATH C/V THIN LAYER: CPT

## 2024-04-04 PROCEDURE — 87801 DETECT AGNT MULT DNA AMPLI: CPT

## 2024-04-04 PROCEDURE — 81025 URINE PREGNANCY TEST: CPT | Performed by: NURSE PRACTITIONER

## 2024-04-04 RX ORDER — VENLAFAXINE HYDROCHLORIDE 150 MG/1
150 CAPSULE, EXTENDED RELEASE ORAL DAILY
COMMUNITY

## 2024-04-04 SDOH — ECONOMIC STABILITY: INCOME INSECURITY: HOW HARD IS IT FOR YOU TO PAY FOR THE VERY BASICS LIKE FOOD, HOUSING, MEDICAL CARE, AND HEATING?: VERY HARD

## 2024-04-04 SDOH — ECONOMIC STABILITY: FOOD INSECURITY: WITHIN THE PAST 12 MONTHS, YOU WORRIED THAT YOUR FOOD WOULD RUN OUT BEFORE YOU GOT MONEY TO BUY MORE.: SOMETIMES TRUE

## 2024-04-04 SDOH — ECONOMIC STABILITY: HOUSING INSECURITY
IN THE LAST 12 MONTHS, WAS THERE A TIME WHEN YOU DID NOT HAVE A STEADY PLACE TO SLEEP OR SLEPT IN A SHELTER (INCLUDING NOW)?: YES

## 2024-04-04 SDOH — ECONOMIC STABILITY: FOOD INSECURITY: WITHIN THE PAST 12 MONTHS, THE FOOD YOU BOUGHT JUST DIDN'T LAST AND YOU DIDN'T HAVE MONEY TO GET MORE.: SOMETIMES TRUE

## 2024-04-04 ASSESSMENT — PATIENT HEALTH QUESTIONNAIRE - PHQ9
SUM OF ALL RESPONSES TO PHQ QUESTIONS 1-9: 0
SUM OF ALL RESPONSES TO PHQ9 QUESTIONS 1 & 2: 0
2. FEELING DOWN, DEPRESSED OR HOPELESS: NOT AT ALL
1. LITTLE INTEREST OR PLEASURE IN DOING THINGS: NOT AT ALL
SUM OF ALL RESPONSES TO PHQ QUESTIONS 1-9: 0

## 2024-04-04 ASSESSMENT — ENCOUNTER SYMPTOMS
VOMITING: 0
SHORTNESS OF BREATH: 0
ABDOMINAL PAIN: 0
CONSTIPATION: 0
RESPIRATORY NEGATIVE: 1
GASTROINTESTINAL NEGATIVE: 1
DIARRHEA: 0
NAUSEA: 0

## 2024-04-04 NOTE — PROGRESS NOTES
24    Parker Vizcaino  2000    Chief Complaint   Patient presents with    Gynecologic Exam     Pt here for annual, is sexually active, irregular menses, LMP-2023, bc-none, pap-2022-neg.     Irregular Menses     Pt c/o amenorrhea x 4 months, LMP-2023. Urine pregnancy test (-).         The patient is a 24 y.o. female,  who presents for her annual exam.  She is menstruating abnormally.  She is  sexually active. She is not currently taking birth control    She reports no gynecological symptoms.      C/o severe headache during orgasms on 3 occasions     Pap smear history: Her last PAP smear was in .  Her results were normal per patient.    Past Medical History:   Diagnosis Date    Anxiety     Asthma     Depression     Irregular menses     Morbid obesity (HCC)     Seasonal allergies        No past surgical history on file.    Family History   Problem Relation Age of Onset    Diabetes Father     Heart Disease Father     Diabetes Mother     Mental Illness Mother     Cancer Mother     Diabetes Brother        Social History     Tobacco Use    Smoking status: Former     Types: Cigarettes, E-Cigarettes    Smokeless tobacco: Never   Vaping Use    Vaping Use: Every day    Substances: Nicotine, THC   Substance Use Topics    Alcohol use: Yes     Comment: rare    Drug use: Yes     Frequency: 7.0 times per week     Types: Marijuana (Weed)       Current Outpatient Medications   Medication Sig Dispense Refill    venlafaxine (EFFEXOR XR) 150 MG extended release capsule Take 1 capsule by mouth daily      albuterol sulfate  (90 Base) MCG/ACT inhaler Inhale 2 puffs into the lungs every 6 hours as needed for Wheezing or Shortness of Breath (or cough) Please include spacer with instructions for use. 18 g 0    ARIPiprazole (ABILIFY) 15 MG tablet Take 1 tablet by mouth daily      dexAMETHasone (DECADRON) 2 MG tablet Take 3 tablets by mouth 1 (one) time if needed (wheezing) 3 tablet 0    diclofenac

## 2024-04-04 NOTE — PATIENT INSTRUCTIONS
[facility] Food Resources*  (Call 211 for more resources.)    [employer] ([place]):    What they offer:  Information on food pantries, mobile food pantries(locations throughout [place], [place] and [facility]), summer meal programs, and senior programs  Address: [address] [place] [address]  Phone Number: [phone]  Website: [email or web address]      [employer] ([place]):   What they offer:  food pantry, soup kitchen, clothing, emergency shelter, tutoring  Address: [address] [place] [address]  Phone Number: [phone]  Website: [email or web address]://[email or web address]      [facility] ([place]):   What they offer: meals on wheels, lunch in one of the 4 dining rooms Monday - Friday  Dining with [employer] for lunch: Call to reserve a spot [phone]  Payment: donations are appreciated, but not required  Phone Number: [phone]  Website: [email or web address]://Snappli.org      [facility] [employer] ([place]):   What they offer: meals on wheels  Phone Number:  [phone]  Website: www.[employer].org/programs/meals-on-wheels      [facility] [employer] ([employer] [place]):   What they offer: provide a card used like cash to purchase healthy food items at approved retailers.  Instructions: Email completed application to [email or web address]   Phone Number: [phone]  Website: [email or web address]      [facility] SNAP ([employer] [place]):  What they offer: provide a card used like cash to purchase healthy food items at approved retailer, must meet income requirements.   Phone number: [phone]  Ways to Apply: complete application by phone, in person, or online  Website: www.[employer]

## 2024-04-05 LAB
EST. AVERAGE GLUCOSE BLD GHB EST-MCNC: 142.7 MG/DL
HBA1C MFR BLD: 6.6 %
HBV SURFACE AG SERPL QL IA: NORMAL
HERPES SIMPLEX VIRUS 1 IGG: NEGATIVE
HERPES SIMPLEX VIRUS 2 IGG: NEGATIVE
HIV 1+2 AB+HIV1 P24 AG SERPL QL IA: NORMAL
HIV 2 AB SERPL QL IA: NORMAL
HIV1 AB SERPL QL IA: NORMAL
HIV1 P24 AG SERPL QL IA: NORMAL
PROLACTIN SERPL IA-MCNC: 18.8 NG/ML
REAGIN+T PALLIDUM IGG+IGM SERPL-IMP: NORMAL
SHBG SERPL-SCNC: 45 NMOL/L (ref 25–122)
TESTOST FREE SERPL-MCNC: 10.3 PG/ML (ref 0.8–7.4)
TESTOST SERPL-MCNC: 69 NG/DL (ref 8–48)
TSH SERPL DL<=0.005 MIU/L-ACNC: 2.6 UIU/ML (ref 0.27–4.2)

## 2024-04-07 LAB
C TRACH RRNA SPEC QL NAA+PROBE: NEGATIVE
N GONORRHOEA RRNA SPEC QL NAA+PROBE: NEGATIVE

## 2024-04-11 DIAGNOSIS — R73.09 ELEVATED HEMOGLOBIN A1C: Primary | ICD-10-CM

## 2024-04-16 ENCOUNTER — OFFICE VISIT (OUTPATIENT)
Dept: OBGYN | Age: 24
End: 2024-04-16
Payer: MEDICAID

## 2024-04-16 VITALS
WEIGHT: 293 LBS | BODY MASS INDEX: 44.41 KG/M2 | SYSTOLIC BLOOD PRESSURE: 140 MMHG | DIASTOLIC BLOOD PRESSURE: 95 MMHG | HEIGHT: 68 IN

## 2024-04-16 DIAGNOSIS — E28.2 PCOS (POLYCYSTIC OVARIAN SYNDROME): ICD-10-CM

## 2024-04-16 DIAGNOSIS — E11.9 TYPE 2 DIABETES MELLITUS WITHOUT COMPLICATION, WITHOUT LONG-TERM CURRENT USE OF INSULIN (HCC): Primary | ICD-10-CM

## 2024-04-16 DIAGNOSIS — R03.0 ELEVATED BLOOD PRESSURE READING: ICD-10-CM

## 2024-04-16 DIAGNOSIS — L73.2 HIDRADENITIS SUPPURATIVA: ICD-10-CM

## 2024-04-16 PROCEDURE — 99214 OFFICE O/P EST MOD 30 MIN: CPT | Performed by: OBSTETRICS & GYNECOLOGY

## 2024-04-16 PROCEDURE — 3044F HG A1C LEVEL LT 7.0%: CPT | Performed by: OBSTETRICS & GYNECOLOGY

## 2024-04-16 RX ORDER — SPIRONOLACTONE 100 MG/1
100 TABLET, FILM COATED ORAL DAILY
Qty: 90 TABLET | Refills: 1 | Status: SHIPPED | OUTPATIENT
Start: 2024-04-16

## 2024-04-16 RX ORDER — DROSPIRENONE AND ETHINYL ESTRADIOL 0.02-3(28)
1 KIT ORAL DAILY
Qty: 28 TABLET | Refills: 11 | Status: SHIPPED | OUTPATIENT
Start: 2024-04-16

## 2024-04-16 RX ORDER — CLINDAMYCIN PHOSPHATE 10 MG/G
GEL TOPICAL
Qty: 60 G | Refills: 11 | Status: SHIPPED | OUTPATIENT
Start: 2024-04-16 | End: 2024-04-23

## 2024-04-16 RX ORDER — METFORMIN HYDROCHLORIDE 500 MG/1
1500 TABLET, EXTENDED RELEASE ORAL
Qty: 90 TABLET | Refills: 11 | Status: SHIPPED | OUTPATIENT
Start: 2024-04-16 | End: 2025-04-11

## 2024-04-16 NOTE — PROGRESS NOTES
4/16/24    Parker Vizcaino  2000    Chief Complaint   Patient presents with    Other     Pt c/o Hidradenitis suppurativa, Hirsutism, and amenorrhea. Pt had labs and u/s. Pt here to discuss.         Parker Vizcaino is a 24 y.o. female who presents today for evaluation of     Pt c/o Hidradenitis suppurativa, Hirsutism, and amenorrhea. Pt had labs and u/s. Pt here to discuss.        Past Medical History:   Diagnosis Date    Anxiety     Asthma     Depression     Hidradenitis suppurativa     Hirsutism     Irregular menses     Morbid obesity (HCC)     Seasonal allergies        No past surgical history on file.    Social History     Tobacco Use    Smoking status: Former     Types: Cigarettes, E-Cigarettes    Smokeless tobacco: Never   Vaping Use    Vaping Use: Every day    Substances: Nicotine, THC   Substance Use Topics    Alcohol use: Yes     Comment: rare    Drug use: Yes     Frequency: 7.0 times per week     Types: Marijuana (Weed)       Family History   Problem Relation Age of Onset    Diabetes Father     Heart Disease Father     Diabetes Mother     Mental Illness Mother     Cancer Mother     Diabetes Brother        Current Outpatient Medications   Medication Sig Dispense Refill    spironolactone (ALDACTONE) 100 MG tablet Take 1 tablet by mouth daily 90 tablet 1    metFORMIN (GLUCOPHAGE-XR) 500 MG extended release tablet Take 3 tablets by mouth daily (with breakfast) 90 tablet 11    drospirenone-ethinyl estradiol (LUCIANO) 3-0.02 MG per tablet Take 1 tablet by mouth daily 28 tablet 11    clindamycin (CLEOCIN-T) 1 % gel Apply topically 2 times daily. 60 g 11    venlafaxine (EFFEXOR XR) 150 MG extended release capsule Take 1 capsule by mouth daily      diclofenac sodium (VOLTAREN) 1 % GEL Apply 2 g topically 4 times daily for 6 days 50 g 0    famotidine (PEPCID) 40 MG tablet Take 1 tablet by mouth daily for 4 days 4 tablet 0    dicyclomine (BENTYL) 10 MG capsule Take 2 capsules by mouth 4 times daily (before meals and

## 2024-05-14 ENCOUNTER — OFFICE VISIT (OUTPATIENT)
Dept: OBGYN | Age: 24
End: 2024-05-14
Payer: MEDICAID

## 2024-05-14 VITALS
HEIGHT: 68 IN | SYSTOLIC BLOOD PRESSURE: 132 MMHG | WEIGHT: 293 LBS | DIASTOLIC BLOOD PRESSURE: 96 MMHG | BODY MASS INDEX: 44.41 KG/M2

## 2024-05-14 DIAGNOSIS — L73.2 HIDRADENITIS SUPPURATIVA: ICD-10-CM

## 2024-05-14 DIAGNOSIS — E28.2 PCOS (POLYCYSTIC OVARIAN SYNDROME): ICD-10-CM

## 2024-05-14 DIAGNOSIS — R03.0 ELEVATED BLOOD PRESSURE READING: ICD-10-CM

## 2024-05-14 DIAGNOSIS — E11.9 TYPE 2 DIABETES MELLITUS WITHOUT COMPLICATION, WITHOUT LONG-TERM CURRENT USE OF INSULIN (HCC): Primary | ICD-10-CM

## 2024-05-14 PROCEDURE — 99214 OFFICE O/P EST MOD 30 MIN: CPT | Performed by: OBSTETRICS & GYNECOLOGY

## 2024-05-14 PROCEDURE — 3044F HG A1C LEVEL LT 7.0%: CPT | Performed by: OBSTETRICS & GYNECOLOGY

## 2024-05-14 PROCEDURE — 36415 COLL VENOUS BLD VENIPUNCTURE: CPT | Performed by: OBSTETRICS & GYNECOLOGY

## 2024-05-14 RX ORDER — GLUCOSAMINE HCL/CHONDROITIN SU 500-400 MG
CAPSULE ORAL
Qty: 200 STRIP | Refills: 5 | Status: SHIPPED | OUTPATIENT
Start: 2024-05-14

## 2024-05-14 RX ORDER — BLOOD-GLUCOSE METER
1 KIT MISCELLANEOUS DAILY
Qty: 1 KIT | Refills: 0 | Status: SHIPPED | OUTPATIENT
Start: 2024-05-14

## 2024-05-14 RX ORDER — LANCETS 30 GAUGE
1 EACH MISCELLANEOUS 4 TIMES DAILY
Qty: 200 EACH | Refills: 0 | Status: SHIPPED | OUTPATIENT
Start: 2024-05-14

## 2024-05-14 NOTE — PROGRESS NOTES
04/04/2024 865142204/05/2024 0935Syphilis Antibody Cascading Reflex [[id number]]  Component Value   Total Syphillis IgG/IgM Non-Reactive   04/04/2024 717730204/05/2024 0957  HIV Screen [[id number]]   Blood   Component Value   HIV Ag/Ab Non-Reactive   HIV-1 Antibody Non-Reactive   HIV ANTIGEN Non-Reactive   HIV-2 Ab Non-Reactive   04/04/2024 842761204/05/2024 0934  Herpes Simplex Virus,I/II,IgG [[id number]]   Blood   Component Value   HSV 1 IgG Negative   HSV 2 IgG Negative   04/04/2024 301876204/05/2024 0217  Hepatitis B Surface Antigen [[id number]]   Blood   Component Value   Hep B S Ag Interp Non-reactive   04/04/2024 1654554/07/2024 1657Chlamydia trachomatis & GC by amplified detection [[id number]]  Component Value   Gc By Thin Prep Negative    Chlamydia By Thin Prep Negative   04/04/2024 4325594/05/2024 0728  Hemoglobin A1C [[id number]]   Blood   Component Value Units   Hemoglobin A1C 6.6  %   Estimated Avg Glucose 142.7 mg/dL   04/04/2024 1160104/05/2024 1653  Testosterone, free, total [[id number]]   (Abnormal)   Blood   Component Value Units   Testosterone 69 High  ng/dL   Sex Hormone Binding 45 nmol/L   Testosterone, Free 10.3 High   pg/mL   04/04/2024 4316244/05/2024 0520  TSH [[id number]]   Blood   Component Value Units   TSH 2.60 uIU/mL   04/04/2024 2427764/05/2024 0520  Prolactin [[id number]]   Blood   Component Value Units   Prolactin 18.8  ng/mL   04/04/2024 443327404/09/2024 1425GYN Cytology [[id number]]   Component Value   No component results   04/04/2024 51377704/04/2024 1100POCT urine pregnancy [[id number]]  Component Value   Preg Test, Ur neg   Control --         5/14/24    Parker HERNANDEZ Carrillo  2000    Chief Complaint   Patient presents with    Other     Pt c/o pcos, hidradenitis suppurativa, currently taking spironolactone, metformin and natalie, LMP-5/7/2024-light flow.         Parker Vizcaino is a 24 y.o. female who presents today for evaluation of pcos and HS, reports that the HS is resolved.  Is not checking glucose.

## 2024-05-15 LAB
ANION GAP SERPL CALCULATED.3IONS-SCNC: 13 MMOL/L (ref 3–16)
BUN SERPL-MCNC: 13 MG/DL (ref 7–20)
CALCIUM SERPL-MCNC: 9.8 MG/DL (ref 8.3–10.6)
CHLORIDE SERPL-SCNC: 100 MMOL/L (ref 99–110)
CO2 SERPL-SCNC: 26 MMOL/L (ref 21–32)
CREAT SERPL-MCNC: 0.7 MG/DL (ref 0.6–1.1)
EST. AVERAGE GLUCOSE BLD GHB EST-MCNC: 151.3 MG/DL
GFR SERPLBLD CREATININE-BSD FMLA CKD-EPI: >90 ML/MIN/{1.73_M2}
GLUCOSE SERPL-MCNC: 86 MG/DL (ref 70–99)
HBA1C MFR BLD: 6.9 %
POTASSIUM SERPL-SCNC: 4.3 MMOL/L (ref 3.5–5.1)
SODIUM SERPL-SCNC: 139 MMOL/L (ref 136–145)

## 2024-08-01 ENCOUNTER — HOSPITAL ENCOUNTER (EMERGENCY)
Age: 24
Discharge: HOME | End: 2024-08-01
Payer: MEDICAID

## 2024-08-01 ENCOUNTER — APPOINTMENT (OUTPATIENT)
Dept: CT IMAGING | Age: 24
End: 2024-08-01
Payer: MEDICAID

## 2024-08-01 ENCOUNTER — APPOINTMENT (OUTPATIENT)
Dept: GENERAL RADIOLOGY | Age: 24
End: 2024-08-01
Payer: MEDICAID

## 2024-08-01 VITALS
OXYGEN SATURATION: 97 % | TEMPERATURE: 99.6 F | SYSTOLIC BLOOD PRESSURE: 136 MMHG | DIASTOLIC BLOOD PRESSURE: 91 MMHG | HEART RATE: 98 BPM | RESPIRATION RATE: 21 BRPM

## 2024-08-01 DIAGNOSIS — R07.9 CHEST PAIN, UNSPECIFIED TYPE: ICD-10-CM

## 2024-08-01 DIAGNOSIS — R51.9 ACUTE NONINTRACTABLE HEADACHE, UNSPECIFIED HEADACHE TYPE: Primary | ICD-10-CM

## 2024-08-01 LAB
ALBUMIN SERPL-MCNC: 3.9 GM/DL (ref 3.4–5)
ALP BLD-CCNC: 128 IU/L (ref 40–129)
ALT SERPL-CCNC: 39 U/L (ref 10–40)
ANION GAP SERPL CALCULATED.3IONS-SCNC: 11 MMOL/L (ref 7–16)
AST SERPL-CCNC: 29 IU/L (ref 15–37)
BASOPHILS ABSOLUTE: 0 K/CU MM
BASOPHILS RELATIVE PERCENT: 0.3 % (ref 0–1)
BILIRUB SERPL-MCNC: 0.2 MG/DL (ref 0–1)
BUN SERPL-MCNC: 7 MG/DL (ref 6–23)
CALCIUM SERPL-MCNC: 9.6 MG/DL (ref 8.3–10.6)
CHLORIDE BLD-SCNC: 97 MMOL/L (ref 99–110)
CO2: 26 MMOL/L (ref 21–32)
CREAT SERPL-MCNC: 0.7 MG/DL (ref 0.6–1.1)
DIFFERENTIAL TYPE: ABNORMAL
EOSINOPHILS ABSOLUTE: 0.2 K/CU MM
EOSINOPHILS RELATIVE PERCENT: 3.2 % (ref 0–3)
GFR, ESTIMATED: >90 ML/MIN/1.73M2
GLUCOSE SERPL-MCNC: 109 MG/DL (ref 70–99)
HCT VFR BLD CALC: 40 % (ref 37–47)
HEMOGLOBIN: 12.5 GM/DL (ref 12.5–16)
IMMATURE NEUTROPHIL %: 0.3 % (ref 0–0.43)
LIPASE: 25 IU/L (ref 13–60)
LYMPHOCYTES ABSOLUTE: 0.5 K/CU MM
LYMPHOCYTES RELATIVE PERCENT: 7.1 % (ref 24–44)
MCH RBC QN AUTO: 27.1 PG (ref 27–31)
MCHC RBC AUTO-ENTMCNC: 31.3 % (ref 32–36)
MCV RBC AUTO: 86.6 FL (ref 78–100)
MONOCYTES ABSOLUTE: 0.7 K/CU MM
MONOCYTES RELATIVE PERCENT: 10.2 % (ref 0–4)
NEUTROPHILS ABSOLUTE: 5.3 K/CU MM
NEUTROPHILS RELATIVE PERCENT: 78.9 % (ref 36–66)
NUCLEATED RBC %: 0 %
PDW BLD-RTO: 13.2 % (ref 11.7–14.9)
PLATELET # BLD: 200 K/CU MM (ref 140–440)
PMV BLD AUTO: 9.6 FL (ref 7.5–11.1)
POTASSIUM SERPL-SCNC: 4.1 MMOL/L (ref 3.5–5.1)
PREGNANCY, SERUM: NEGATIVE
RBC # BLD: 4.62 M/CU MM (ref 4.2–5.4)
SODIUM BLD-SCNC: 134 MMOL/L (ref 135–145)
TOTAL IMMATURE NEUTOROPHIL: 0.02 K/CU MM
TOTAL NUCLEATED RBC: 0 K/CU MM
TOTAL PROTEIN: 7.6 GM/DL (ref 6.4–8.2)
TROPONIN, HIGH SENSITIVITY: <6 NG/L (ref 0–14)
WBC # BLD: 6.8 K/CU MM (ref 4–10.5)

## 2024-08-01 PROCEDURE — 84484 ASSAY OF TROPONIN QUANT: CPT

## 2024-08-01 PROCEDURE — 70450 CT HEAD/BRAIN W/O DYE: CPT

## 2024-08-01 PROCEDURE — 99285 EMERGENCY DEPT VISIT HI MDM: CPT

## 2024-08-01 PROCEDURE — 96374 THER/PROPH/DIAG INJ IV PUSH: CPT

## 2024-08-01 PROCEDURE — 80053 COMPREHEN METABOLIC PANEL: CPT

## 2024-08-01 PROCEDURE — 84703 CHORIONIC GONADOTROPIN ASSAY: CPT

## 2024-08-01 PROCEDURE — 96375 TX/PRO/DX INJ NEW DRUG ADDON: CPT

## 2024-08-01 PROCEDURE — 6360000002 HC RX W HCPCS

## 2024-08-01 PROCEDURE — 6370000000 HC RX 637 (ALT 250 FOR IP)

## 2024-08-01 PROCEDURE — 83690 ASSAY OF LIPASE: CPT

## 2024-08-01 PROCEDURE — 85025 COMPLETE CBC W/AUTO DIFF WBC: CPT

## 2024-08-01 PROCEDURE — 2580000003 HC RX 258

## 2024-08-01 PROCEDURE — 93005 ELECTROCARDIOGRAM TRACING: CPT

## 2024-08-01 PROCEDURE — 96361 HYDRATE IV INFUSION ADD-ON: CPT

## 2024-08-01 PROCEDURE — 71045 X-RAY EXAM CHEST 1 VIEW: CPT

## 2024-08-01 RX ORDER — 0.9 % SODIUM CHLORIDE 0.9 %
1000 INTRAVENOUS SOLUTION INTRAVENOUS ONCE
Status: COMPLETED | OUTPATIENT
Start: 2024-08-01 | End: 2024-08-01

## 2024-08-01 RX ORDER — ACETAMINOPHEN 500 MG
1000 TABLET ORAL ONCE
Status: COMPLETED | OUTPATIENT
Start: 2024-08-01 | End: 2024-08-01

## 2024-08-01 RX ORDER — PROCHLORPERAZINE EDISYLATE 5 MG/ML
10 INJECTION INTRAMUSCULAR; INTRAVENOUS ONCE
Status: COMPLETED | OUTPATIENT
Start: 2024-08-01 | End: 2024-08-01

## 2024-08-01 RX ORDER — DIPHENHYDRAMINE HYDROCHLORIDE 50 MG/ML
25 INJECTION INTRAMUSCULAR; INTRAVENOUS ONCE
Status: COMPLETED | OUTPATIENT
Start: 2024-08-01 | End: 2024-08-01

## 2024-08-01 RX ADMIN — SODIUM CHLORIDE 1000 ML: 9 INJECTION, SOLUTION INTRAVENOUS at 13:22

## 2024-08-01 RX ADMIN — PROCHLORPERAZINE EDISYLATE 10 MG: 5 INJECTION INTRAMUSCULAR; INTRAVENOUS at 13:23

## 2024-08-01 RX ADMIN — DIPHENHYDRAMINE HYDROCHLORIDE 25 MG: 50 INJECTION, SOLUTION INTRAMUSCULAR; INTRAVENOUS at 13:23

## 2024-08-01 RX ADMIN — ACETAMINOPHEN 1000 MG: 500 TABLET ORAL at 13:23

## 2024-08-01 ASSESSMENT — PAIN DESCRIPTION - LOCATION: LOCATION: HEAD;CHEST

## 2024-08-01 ASSESSMENT — PAIN SCALES - GENERAL
PAINLEVEL_OUTOF10: 7
PAINLEVEL_OUTOF10: 9
PAINLEVEL_OUTOF10: 0

## 2024-08-01 ASSESSMENT — PAIN - FUNCTIONAL ASSESSMENT
PAIN_FUNCTIONAL_ASSESSMENT: 0-10
PAIN_FUNCTIONAL_ASSESSMENT: 0-10

## 2024-08-01 NOTE — ED PROVIDER NOTES
Emergency Department Encounter  Location: [facility] EMERGENCY DEPARTMENT    Patient: Parker Vizcaino  MRN: [id number]  : 2000  Date of evaluation: 2024  ED Provider: Thomas Trimble DO    Chief Complaint:    Chest Pain and Headache (Pt presents to the ED with complaints of chest pain. States that she woke up with the pain and it has not gotten better. Also complaining of a headache. )    [place]:  Parker Vizcaino is a 24 y.o. female that presents to the emergency department complaining of headache and chest pain.  Patient states she woke up and she had a soft 10 in severity.  She also had chest pain.  She describes her chest pain as dull and constant.  Her headache is described as someone \"squeezing her brain \".  It is now 9 out of 10 in severity.  No nausea or vomiting.  Standing makes her headache worse.  History of asthma.  Told that she has a beginning of diabetes.      Past Medical History:   Diagnosis Date    Anxiety     Asthma     Depression     Hidradenitis suppurativa     Hirsutism     Irregular menses     Morbid obesity (HCC)     PCOS (polycystic ovarian syndrome)     Seasonal allergies      History reviewed. No pertinent surgical history.  Family History   Problem Relation Age of Onset    Diabetes Father     Heart Disease Father     Diabetes Mother     Mental Illness Mother     Cancer Mother     Diabetes Brother      Social History     Socioeconomic History    Marital status: Single     Spouse name: Not on file    Number of children: Not on file    Years of education: Not on file    Highest education level: Not on file   Occupational History    Not on file   Tobacco Use    Smoking status: Former     Types: Cigarettes, E-Cigarettes    Smokeless tobacco: Never   Vaping Use    Vaping Use: Every day    Substances: Nicotine, THC   Substance and Sexual Activity    Alcohol use: Yes     Comment: rare    Drug use: Yes     Frequency: 7.0 times per week     Types:  Non-distended. Non-tender.  No guarding or rigidity.  EXTREMITIES: No acute deformities. Left sided pronator drift  SKIN: Warm, dry, intact.   NEUROLOGICAL: No gross facial drooping, Moves all 4 extremities spontaneously, nl gait,  Left sided pronator drift, sensation grossly intact. Strength 5/5 in all extremities. Nl finger-nose. NL heal-shin no nystagmus  PSYCHIATRIC: Normal mood, normal affect              Labs:  Results for orders placed or performed during the hospital encounter of 08/01/24   CBC with Auto Differential   Result Value Ref Range    WBC 6.8 4.0 - 10.5 K/CU MM    RBC 4.62 4.2 - 5.4 M/CU MM    Hemoglobin 12.5 12.5 - 16.0 GM/DL    Hematocrit 40.0 37 - 47 %    MCV 86.6 78 - 100 FL    MCH 27.1 27 - 31 PG    MCHC 31.3 (L) 32.0 - 36.0 %    RDW 13.2 11.7 - 14.9 %    Platelets 200 140 - 440 K/CU MM    MPV 9.6 7.5 - 11.1 FL    Differential Type AUTOMATED DIFFERENTIAL     Neutrophils % 78.9 (H) 36 - 66 %    Lymphocytes % 7.1 (L) 24 - 44 %    Monocytes % 10.2 (H) 0 - 4 %    Eosinophils % 3.2 (H) 0 - 3 %    Basophils % 0.3 0 - 1 %    Neutrophils Absolute 5.3 K/CU MM    Lymphocytes Absolute 0.5 K/CU MM    Monocytes Absolute 0.7 K/CU MM    Eosinophils Absolute 0.2 K/CU MM    Basophils Absolute 0.0 K/CU MM    Nucleated RBC % 0.0 %    Total Nucleated RBC 0.0 K/CU MM    Total Immature Neutrophil 0.02 K/CU MM    Immature Neutrophil % 0.3 0 - 0.43 %   Comprehensive Metabolic Panel   Result Value Ref Range    Sodium 134 (L) 135 - 145 MMOL/L    Potassium 4.1 3.5 - 5.1 MMOL/L    Chloride 97 (L) 99 - 110 mMol/L    CO2 26 21 - 32 MMOL/L    Anion Gap 11 7 - 16    Glucose 109 (H) 70 - 99 MG/DL    BUN 7 6 - 23 MG/DL    Creatinine 0.7 0.6 - 1.1 MG/DL    Est, Glom Filt Rate >90 >60 mL/min/1.73m2    Calcium 9.6 8.3 - 10.6 MG/DL    Total Protein 7.6 6.4 - 8.2 GM/DL    Albumin 3.9 3.4 - 5.0 GM/DL    Total Bilirubin 0.2 0.0 - 1.0 MG/DL    Alkaline Phosphatase 128 40 - 129 IU/L    ALT 39 10 - 40 U/L    AST 29 15 - 37 IU/L

## 2024-08-02 LAB
EKG ATRIAL RATE: 94 BPM
EKG DIAGNOSIS: NORMAL
EKG P AXIS: 56 DEGREES
EKG P-R INTERVAL: 180 MS
EKG Q-T INTERVAL: 330 MS
EKG QRS DURATION: 88 MS
EKG QTC CALCULATION (BAZETT): 412 MS
EKG R AXIS: 44 DEGREES
EKG T AXIS: 23 DEGREES
EKG VENTRICULAR RATE: 94 BPM

## 2024-08-02 PROCEDURE — 93010 ELECTROCARDIOGRAM REPORT: CPT | Performed by: INTERNAL MEDICINE

## 2024-08-02 ASSESSMENT — HEART SCORE: ECG: NORMAL

## 2024-09-11 ENCOUNTER — HOSPITAL ENCOUNTER (EMERGENCY)
Age: 24
Discharge: HOME | End: 2024-09-11
Payer: MEDICAID

## 2024-09-11 VITALS
HEIGHT: 69 IN | OXYGEN SATURATION: 99 % | TEMPERATURE: 98 F | RESPIRATION RATE: 20 BRPM | HEART RATE: 99 BPM | BODY MASS INDEX: 43.4 KG/M2 | WEIGHT: 293 LBS | DIASTOLIC BLOOD PRESSURE: 59 MMHG | SYSTOLIC BLOOD PRESSURE: 128 MMHG

## 2024-09-11 DIAGNOSIS — J01.90 ACUTE SINUSITIS, RECURRENCE NOT SPECIFIED, UNSPECIFIED LOCATION: Primary | ICD-10-CM

## 2024-09-11 DIAGNOSIS — F32.A DEPRESSION, UNSPECIFIED DEPRESSION TYPE: ICD-10-CM

## 2024-09-11 PROCEDURE — 99283 EMERGENCY DEPT VISIT LOW MDM: CPT

## 2024-09-11 RX ORDER — METHYLPREDNISOLONE 4 MG
TABLET, DOSE PACK ORAL
Qty: 1 KIT | Refills: 0 | Status: SHIPPED | OUTPATIENT
Start: 2024-09-11 | End: 2024-09-17

## 2024-09-11 RX ORDER — AZITHROMYCIN 250 MG/1
TABLET, FILM COATED ORAL
Qty: 6 TABLET | Refills: 0 | Status: SHIPPED | OUTPATIENT
Start: 2024-09-11 | End: 2024-09-21

## 2024-09-11 RX ORDER — ECHINACEA PURPUREA EXTRACT 125 MG
1 TABLET ORAL PRN
Qty: 1 EACH | Refills: 0 | Status: SHIPPED | OUTPATIENT
Start: 2024-09-11

## 2024-09-11 ASSESSMENT — PAIN SCALES - GENERAL
PAINLEVEL_OUTOF10: 8
PAINLEVEL_OUTOF10: 8

## 2024-09-11 ASSESSMENT — PAIN - FUNCTIONAL ASSESSMENT
PAIN_FUNCTIONAL_ASSESSMENT: 0-10
PAIN_FUNCTIONAL_ASSESSMENT: 0-10

## 2024-09-11 ASSESSMENT — PAIN DESCRIPTION - LOCATION
LOCATION: HEAD
LOCATION: HEAD

## 2024-10-24 ENCOUNTER — HOSPITAL ENCOUNTER (EMERGENCY)
Age: 24
Discharge: HOME | End: 2024-10-24
Attending: EMERGENCY MEDICINE
Payer: MEDICAID

## 2024-10-24 VITALS
OXYGEN SATURATION: 96 % | RESPIRATION RATE: 18 BRPM | TEMPERATURE: 97.7 F | BODY MASS INDEX: 44.41 KG/M2 | WEIGHT: 293 LBS | HEART RATE: 105 BPM | DIASTOLIC BLOOD PRESSURE: 101 MMHG | SYSTOLIC BLOOD PRESSURE: 144 MMHG | HEIGHT: 68 IN

## 2024-10-24 DIAGNOSIS — I10 HYPERTENSION, UNSPECIFIED TYPE: ICD-10-CM

## 2024-10-24 DIAGNOSIS — Z20.2 POSSIBLE EXPOSURE TO STD: ICD-10-CM

## 2024-10-24 DIAGNOSIS — N76.0 VAGINITIS AND VULVOVAGINITIS: Primary | ICD-10-CM

## 2024-10-24 LAB
BILIRUB UR QL STRIP: NEGATIVE
CLARITY UR: CLEAR
CLUE CELLS VAG QL WET PREP: ABNORMAL
COLOR UR: YELLOW
GLUCOSE UR STRIP-MCNC: >=1000 MG/DL
HCG UR QL: NEGATIVE
HGB UR QL STRIP.AUTO: NEGATIVE
KETONES UR STRIP-MCNC: 15 MG/DL
LEUKOCYTE ESTERASE UR QL STRIP: NEGATIVE
NITRITE UR QL STRIP: NEGATIVE
PH UR STRIP: 6 [PH] (ref 5–8)
PROT UR STRIP-MCNC: NEGATIVE MG/DL
SOURCE WET PREP: ABNORMAL
SP GR UR STRIP: 1.01 (ref 1–1.03)
T VAGINALIS VAG QL WET PREP: ABNORMAL
UROBILINOGEN UR STRIP-ACNC: 0.2 EU/DL (ref 0–1)
YEAST WET PREP: ABNORMAL

## 2024-10-24 PROCEDURE — 84703 CHORIONIC GONADOTROPIN ASSAY: CPT

## 2024-10-24 PROCEDURE — 99283 EMERGENCY DEPT VISIT LOW MDM: CPT

## 2024-10-24 PROCEDURE — 6370000000 HC RX 637 (ALT 250 FOR IP): Performed by: EMERGENCY MEDICINE

## 2024-10-24 PROCEDURE — 87210 SMEAR WET MOUNT SALINE/INK: CPT

## 2024-10-24 PROCEDURE — 2500000003 HC RX 250 WO HCPCS: Performed by: EMERGENCY MEDICINE

## 2024-10-24 PROCEDURE — 6360000002 HC RX W HCPCS: Performed by: EMERGENCY MEDICINE

## 2024-10-24 PROCEDURE — 96372 THER/PROPH/DIAG INJ SC/IM: CPT

## 2024-10-24 PROCEDURE — 81003 URINALYSIS AUTO W/O SCOPE: CPT

## 2024-10-24 RX ORDER — AZITHROMYCIN 250 MG/1
1000 TABLET, FILM COATED ORAL ONCE
Status: COMPLETED | OUTPATIENT
Start: 2024-10-24 | End: 2024-10-24

## 2024-10-24 RX ORDER — FLUCONAZOLE 150 MG/1
150 TABLET ORAL ONCE
Qty: 1 TABLET | Refills: 0 | Status: SHIPPED | OUTPATIENT
Start: 2024-10-24 | End: 2024-10-24

## 2024-10-24 RX ORDER — FLUCONAZOLE 100 MG/1
200 TABLET ORAL ONCE
Status: COMPLETED | OUTPATIENT
Start: 2024-10-24 | End: 2024-10-24

## 2024-10-24 RX ADMIN — AZITHROMYCIN DIHYDRATE 1000 MG: 250 TABLET ORAL at 04:38

## 2024-10-24 RX ADMIN — FLUCONAZOLE 200 MG: 100 TABLET ORAL at 04:39

## 2024-10-24 RX ADMIN — LIDOCAINE HYDROCHLORIDE 500 MG: 10 INJECTION, SOLUTION EPIDURAL; INFILTRATION; INTRACAUDAL; PERINEURAL at 04:37

## 2024-10-24 NOTE — ED TRIAGE NOTES
Pt to the ED with c/o possible yeast infection. Pt states that she is having itching and discharge that started yesterday. Pt states that she has been sexually active.

## 2024-10-24 NOTE — DISCHARGE INSTRUCTIONS
Your swabs today did show a few yeast cells.  I will prescribe an additional dose of fluconazole to be used as needed.    The remainder of your testing is pending but she has been empirically treated.  Please follow the MyChart or have your primary care physician or obstetrician follow-up.    If you develop any worsening or concerning symptoms, please seek immediate medical evaluation

## 2024-10-24 NOTE — ED PROVIDER NOTES
[facility] EMERGENCY DEPARTMENT  EMERGENCY DEPARTMENT ENCOUNTER      Pt Name: Parker Vizcaino  MRN: [id number]  Birthdate 2000  Date of evaluation: 10/24/2024  Provider: Poornima Santiago MD    CHIEF COMPLAINT       Chief Complaint   Patient presents with    Vaginitis         HISTORY OF PRESENT ILLNESS      Parker Vizcaino is a 24 y.o. female who presents to the emergency department  for   Chief Complaint   Patient presents with    Vaginitis       24-year-old female presents complaining of some vaginal irritation and discharge.  She is concerned about the possibility of yeast infection.  She also endorses that she had unprotected sex recently and is concerned about          Nursing Notes, Triage Notes & Vital Signs were reviewed.      REVIEW OF SYSTEMS    (2-9 systems for level 4, 10 or more for level 5)     Review of Systems    Except as noted above the remainder of the review of systems was reviewed and negative.       PAST MEDICAL HISTORY     Past Medical History:   Diagnosis Date    Anxiety     Asthma     Depression     Hidradenitis suppurativa     Hirsutism     Irregular menses     Morbid obesity     PCOS (polycystic ovarian syndrome)     Seasonal allergies        Prior to Admission medications    Medication Sig Start Date End Date Taking? Authorizing Provider   sodium chloride (ALTAMIST SPRAY) 0.65 % nasal spray 1 spray by Nasal route as needed for Congestion 9/11/24   Wiliam Smalls PA   Lancets MISC 1 each by Does not apply route 4 times daily 5/14/24   Flaquito Herrera MD   blood glucose monitor strips Test 4 times a day & as needed for symptoms of irregular blood glucose. Dispense sufficient amount for indicated testing frequency plus additional to accommodate PRN testing needs. 5/14/24   Flaquito Herrera MD   glucose monitoring kit 1 kit by Does not apply route daily 5/14/24   Flaquito Herrera MD   spironolactone (ALDACTONE) 100

## 2024-10-27 LAB
MISCELLANEOUS LAB TEST RESULT: NORMAL
TEST NAME: NORMAL

## 2024-10-27 PROCEDURE — 99283 EMERGENCY DEPT VISIT LOW MDM: CPT

## 2024-10-27 ASSESSMENT — LIFESTYLE VARIABLES
HOW OFTEN DO YOU HAVE A DRINK CONTAINING ALCOHOL: NEVER
HOW MANY STANDARD DRINKS CONTAINING ALCOHOL DO YOU HAVE ON A TYPICAL DAY: PATIENT DOES NOT DRINK

## 2024-10-27 ASSESSMENT — PAIN SCALES - GENERAL: PAINLEVEL_OUTOF10: 5

## 2024-10-27 ASSESSMENT — PAIN - FUNCTIONAL ASSESSMENT: PAIN_FUNCTIONAL_ASSESSMENT: 0-10

## 2024-10-28 ENCOUNTER — HOSPITAL ENCOUNTER (EMERGENCY)
Age: 24
Discharge: HOME | End: 2024-10-28
Payer: MEDICAID

## 2024-10-28 VITALS
RESPIRATION RATE: 15 BRPM | TEMPERATURE: 98.2 F | WEIGHT: 293 LBS | OXYGEN SATURATION: 96 % | DIASTOLIC BLOOD PRESSURE: 77 MMHG | HEART RATE: 100 BPM | SYSTOLIC BLOOD PRESSURE: 146 MMHG | HEIGHT: 68 IN | BODY MASS INDEX: 44.41 KG/M2

## 2024-10-28 DIAGNOSIS — K60.2 ANAL FISSURE: Primary | ICD-10-CM

## 2024-10-28 RX ORDER — ANTIPRURITIC (ANTI-ITCH) 1 G/100G
OINTMENT TOPICAL
Qty: 28.4 G | Refills: 0 | Status: SHIPPED | OUTPATIENT
Start: 2024-10-28

## 2024-10-28 ASSESSMENT — PAIN SCALES - GENERAL: PAINLEVEL_OUTOF10: 5

## 2024-10-28 ASSESSMENT — PAIN - FUNCTIONAL ASSESSMENT: PAIN_FUNCTIONAL_ASSESSMENT: 0-10

## 2024-10-28 NOTE — ED PROVIDER NOTES
Triage Chief Complaint:   Vaginitis    [place]:  Today in the ED I had the pleasure of caring for Aunlinus Vizcaino who is a 24 y.o. female that presents today to the ED for evaluation for vaginal irritation. She was dx with yeast infection 4 days ago and she has had continued irritaion. She has had some nasuea and blood streaks on toiletpaper after having bm and mild pain with defacation. No n/v/f/c/ or abdominal pain. She states she has been straining more than usual.     ROS:  REVIEW OF SYSTEMS    At least 10 systems reviewed      All other review of systems are negative  See HPI and nursing notes for additional information       Past Medical History:   Diagnosis Date    Anxiety     Asthma     Depression     Hidradenitis suppurativa     Hirsutism     Irregular menses     Morbid obesity     PCOS (polycystic ovarian syndrome)     Seasonal allergies      History reviewed. No pertinent surgical history.  Family History   Problem Relation Age of Onset    Diabetes Father     Heart Disease Father     Diabetes Mother     Mental Illness Mother     Cancer Mother     Diabetes Brother      Social History     Socioeconomic History    Marital status: Single     Spouse name: Not on file    Number of children: Not on file    Years of education: Not on file    Highest education level: Not on file   Occupational History    Not on file   Tobacco Use    Smoking status: Former     Types: Cigarettes, E-Cigarettes    Smokeless tobacco: Never   Vaping Use    Vaping status: Every Day    Substances: Nicotine, THC   Substance and Sexual Activity    Alcohol use: Yes     Comment: rare    Drug use: Yes     Frequency: 7.0 times per week     Types: Marijuana (Weed)    Sexual activity: Yes     Partners: Male   Other Topics Concern    Not on file   Social History Narrative    Not on file     Social Determinants of Health     Financial Resource Strain: High Risk (4/4/2024)    Overall Financial Resource Strain (CARDIA)     Difficulty of Paying Living

## 2024-10-28 NOTE — ED NOTES
Discharged from ED with both verbal/typed instructions given, explained in detail of diagnosis, suggested follow up with PCP and use of given RX.  Verbalized understanding.  Work note given.   Ambulatory from ED without difficulty, gait steady

## 2024-10-28 NOTE — ED TRIAGE NOTES
Patient presents to the ED from home with complaints of a yeast infection. Patient states she was seen a couple days ago and prescribed medications to which she is having no relief. Patient rates pain 5/10 at this time. States it used to not hurt at all.